# Patient Record
Sex: FEMALE | Race: WHITE | NOT HISPANIC OR LATINO | Employment: FULL TIME | URBAN - METROPOLITAN AREA
[De-identification: names, ages, dates, MRNs, and addresses within clinical notes are randomized per-mention and may not be internally consistent; named-entity substitution may affect disease eponyms.]

---

## 2023-01-25 ENCOUNTER — TELEPHONE (OUTPATIENT)
Dept: OBSTETRICS AND GYNECOLOGY | Facility: CLINIC | Age: 48
End: 2023-01-25

## 2023-01-25 NOTE — TELEPHONE ENCOUNTER
Caller: Lexus Tejada    Relationship to patient: Self    Best call back number: [phone]      Type of visit: NEW GYN       If rescheduling, when is the original appointment: 1/25/23    Additional notes:PT CANCELLED TODAYS APPT DUE TO BEING ON HER CYCLE SHE WILL CALL BACK TO R/S.

## 2024-06-03 ENCOUNTER — OFFICE VISIT (OUTPATIENT)
Dept: ORTHOPEDIC SURGERY | Facility: CLINIC | Age: 49
End: 2024-06-03
Payer: COMMERCIAL

## 2024-06-03 VITALS
HEART RATE: 72 BPM | WEIGHT: 196 LBS | HEIGHT: 61 IN | DIASTOLIC BLOOD PRESSURE: 93 MMHG | BODY MASS INDEX: 37 KG/M2 | SYSTOLIC BLOOD PRESSURE: 180 MMHG | OXYGEN SATURATION: 97 %

## 2024-06-03 DIAGNOSIS — M17.0 OSTEOARTHRITIS OF BOTH KNEES, UNSPECIFIED OSTEOARTHRITIS TYPE: ICD-10-CM

## 2024-06-03 DIAGNOSIS — M25.561 PAIN IN BOTH KNEES, UNSPECIFIED CHRONICITY: Primary | ICD-10-CM

## 2024-06-03 DIAGNOSIS — M25.562 PAIN IN BOTH KNEES, UNSPECIFIED CHRONICITY: Primary | ICD-10-CM

## 2024-06-03 PROCEDURE — 20610 DRAIN/INJ JOINT/BURSA W/O US: CPT | Performed by: ORTHOPAEDIC SURGERY

## 2024-06-03 PROCEDURE — 99203 OFFICE O/P NEW LOW 30 MIN: CPT | Performed by: ORTHOPAEDIC SURGERY

## 2024-06-03 RX ORDER — GABAPENTIN 300 MG/1
CAPSULE ORAL
COMMUNITY
Start: 2024-05-06

## 2024-06-03 RX ORDER — CHOLECALCIFEROL (VITAMIN D3) 1250 MCG
CAPSULE ORAL
COMMUNITY
Start: 2024-05-07

## 2024-06-03 RX ORDER — TRIAMCINOLONE ACETONIDE 40 MG/ML
40 INJECTION, SUSPENSION INTRA-ARTICULAR; INTRAMUSCULAR
Status: COMPLETED | OUTPATIENT
Start: 2024-06-03 | End: 2024-06-03

## 2024-06-03 RX ORDER — FERROUS SULFATE 324(65)MG
1 TABLET, DELAYED RELEASE (ENTERIC COATED) ORAL
COMMUNITY
Start: 2024-02-07

## 2024-06-03 RX ORDER — LEVOTHYROXINE SODIUM 112 UG/1
TABLET ORAL
COMMUNITY
Start: 2024-05-07

## 2024-06-03 RX ORDER — LIDOCAINE HYDROCHLORIDE 10 MG/ML
5 INJECTION, SOLUTION INFILTRATION; PERINEURAL
Status: COMPLETED | OUTPATIENT
Start: 2024-06-03 | End: 2024-06-03

## 2024-06-03 RX ORDER — BISOPROLOL FUMARATE AND HYDROCHLOROTHIAZIDE 10; 6.25 MG/1; MG/1
1 TABLET ORAL DAILY
COMMUNITY
Start: 2024-05-20

## 2024-06-03 RX ORDER — TIZANIDINE 4 MG/1
1 TABLET ORAL 3 TIMES DAILY
COMMUNITY
Start: 2024-05-29

## 2024-06-03 RX ORDER — LOSARTAN POTASSIUM 25 MG/1
TABLET ORAL
COMMUNITY
Start: 2024-05-06

## 2024-06-03 RX ADMIN — TRIAMCINOLONE ACETONIDE 40 MG: 40 INJECTION, SUSPENSION INTRA-ARTICULAR; INTRAMUSCULAR at 15:09

## 2024-06-03 RX ADMIN — LIDOCAINE HYDROCHLORIDE 5 ML: 10 INJECTION, SOLUTION INFILTRATION; PERINEURAL at 15:09

## 2024-06-03 NOTE — PROGRESS NOTES
"Chief Complaint  Initial Evaluation of the Right Knee and Initial Evaluation of the Left Knee     Subjective      Lexus Tejada presents to [facility] for initial evaluation of bilateral knees. She works on concrete floor and has pain all the time.  The pain is affecting her daily tasks and ADL's.  She has had injections in the past.  She notes the knees are getting worse and has tried exercises and anti inflammatories.  She has had no recent injury or fall.     Allergies   Allergen Reactions    Lisinopril Anaphylaxis    Penicillin G Other (See Comments)     Patient states when she was a kid        Social History     Socioeconomic History    Marital status:    Tobacco Use    Smoking status: Never    Smokeless tobacco: Never   Vaping Use    Vaping status: Never Used        I reviewed the patient's chief complaint, history of present illness, review of systems, past medical history, surgical history, family history, social history, medications, and allergy list.     Review of Systems     Constitutional: Denies fevers, chills, weight loss  Cardiovascular: Denies chest pain, shortness of breath  Skin: Denies rashes, acute skin changes  Neurologic: Denies headache, loss of consciousness        Vital Signs:   /93   Pulse 72   Ht 154.9 cm (61\")   Wt 88.9 kg (196 lb)   SpO2 97%   BMI 37.03 kg/m²          Physical Exam  General: Alert. No acute distress    Ortho Exam        BILATERAL KNEES Flexion 120. Extension 0. Stable to varus/valgus stress. Stable to anterior/posterior drawer. Neurovascularly intact. Negative Heladio. Negative Lachman. Positive EHL, FHL, HS and TA. Sensation intact to light touch all 5 nerves of the foot. Ambulates with Antalgic gait. Patella is well tracking. Calf supple, non-tender. Positive tenderness to the medial joint line. Positive tenderness to the lateral joint line. Positive Crepitus. Good strength to hamstrings, quadriceps, dorsiflexors, " and plantar flexors.  Knee Extensor Mechanism intact Kellgren-Martin rating scale a 4.         Large Joint Arthrocentesis: R knee  Date/Time: 6/3/2024 3:09 PM  Consent given by: patient  Site marked: site marked  Timeout: Immediately prior to procedure a time out was called to verify the correct patient, procedure, equipment, support staff and site/side marked as required   Supporting Documentation  Indications: pain   Procedure Details  Location: knee - R knee  Needle gauge: 21 G.  Medications administered: 5 mL lidocaine 1 %; 40 mg triamcinolone acetonide 40 MG/ML  Patient tolerance: patient tolerated the procedure well with no immediate complications      Large Joint Arthrocentesis: L knee  Date/Time: 6/3/2024 3:09 PM  Consent given by: patient  Site marked: site marked  Timeout: Immediately prior to procedure a time out was called to verify the correct patient, procedure, equipment, support staff and site/side marked as required   Supporting Documentation  Indications: pain   Procedure Details  Location: knee - L knee  Needle gauge: 21 G.  Medications administered: 5 mL lidocaine 1 %; 40 mg triamcinolone acetonide 40 MG/ML  Patient tolerance: patient tolerated the procedure well with no immediate complications      This injection documentation was Scribed for Juancho Fontaine MD by Radha Dodge.  06/03/24   15:10 EDT      Imaging Results (Most Recent)       Procedure Component Value Units Date/Time    XR Knee 3 View Right [[id number]] Resulted: 06/03/24 1437     Updated: 06/03/24 1444    XR Knee 3 View Left [[id number]] Resulted: 06/03/24 1437     Updated: 06/03/24 1444             Result Review :     X-Ray Report:  Right knee X-Ray  Indication: Evaluation of the right knee  AP/Lateral and [place] view(s)  Findings: Advanced degenerative bone on bone in the medial compartment and the patella femoral line.   Prior studies available for comparison: no     X-Ray Report:  Left knee X-Ray  Indication: Evaluation of the  left knee  AP/Lateral and [place] view(s)  Findings: Advanced degenerative bone on bone in the medial compartment and the patella femoral line.   Prior studies available for comparison: no             Assessment and Plan     Diagnoses and all orders for this visit:    1. Pain in both knees, unspecified chronicity (Primary)  -     XR Knee 3 View Right  -     XR Knee 3 View Left    2. Osteoarthritis of both knees, unspecified osteoarthritis type  -     Large Joint Arthrocentesis: R knee  -     Large Joint Arthrocentesis: L knee        Discussed the treatment plan with the patient. I reviewed the X-rays that were obtained today with the patient.     Discussed the treatment options with the patient, operative vs non-operative. The patient is a candidate for a total knee arthroplasty whenever she is ready.      Discussed the risks and benefits of conservative measures. The patient expressed understanding and wished to proceed with bilateral knee steroid injections.  She tolerated the injections well.       Call or return if worsening symptoms.    Follow Up     4-6 weeks Discuss if injection was helpful vs surgical intervention.       Patient was given instructions and counseling regarding her condition or for health maintenance advice. Please see specific information pulled into the AVS if appropriate.     Scribed for Juancho Fontaine MD by Sydnee Bonilla MA.  06/03/24   14:36 EDT      I have personally performed the services described in this document as scribed by the above individual and it is both accurate and complete. Juancho Fontaine MD 06/03/24

## 2024-07-03 ENCOUNTER — OFFICE VISIT (OUTPATIENT)
Dept: ORTHOPEDIC SURGERY | Facility: CLINIC | Age: 49
End: 2024-07-03
Payer: COMMERCIAL

## 2024-07-03 ENCOUNTER — PREP FOR SURGERY (OUTPATIENT)
Dept: OTHER | Facility: HOSPITAL | Age: 49
End: 2024-07-03
Payer: COMMERCIAL

## 2024-07-03 VITALS
BODY MASS INDEX: 37 KG/M2 | HEART RATE: 73 BPM | SYSTOLIC BLOOD PRESSURE: 165 MMHG | HEIGHT: 61 IN | OXYGEN SATURATION: 97 % | WEIGHT: 195.99 LBS | DIASTOLIC BLOOD PRESSURE: 93 MMHG

## 2024-07-03 DIAGNOSIS — M25.561 PAIN IN BOTH KNEES, UNSPECIFIED CHRONICITY: Primary | ICD-10-CM

## 2024-07-03 DIAGNOSIS — M17.0 OSTEOARTHRITIS OF BOTH KNEES, UNSPECIFIED OSTEOARTHRITIS TYPE: ICD-10-CM

## 2024-07-03 DIAGNOSIS — Z96.652 AFTERCARE FOLLOWING LEFT KNEE JOINT REPLACEMENT SURGERY: Primary | ICD-10-CM

## 2024-07-03 DIAGNOSIS — Z47.1 AFTERCARE FOLLOWING LEFT KNEE JOINT REPLACEMENT SURGERY: Primary | ICD-10-CM

## 2024-07-03 DIAGNOSIS — M25.562 PAIN IN BOTH KNEES, UNSPECIFIED CHRONICITY: Primary | ICD-10-CM

## 2024-07-03 PROCEDURE — 99214 OFFICE O/P EST MOD 30 MIN: CPT

## 2024-07-03 RX ORDER — TRANEXAMIC ACID 10 MG/ML
1000 INJECTION, SOLUTION INTRAVENOUS ONCE
OUTPATIENT
Start: 2024-07-03 | End: 2024-07-03

## 2024-07-05 NOTE — PROGRESS NOTES
"Chief Complaint  Pain and Follow-up of the Left Knee and Pain and Follow-up of the Right Knee    Subjective      Lexus Tejada presents to [facility] for follow up of her left knee. Patient has bilateral knee pain and severe osteoarthritis that she has tried managing for years. She was last seen in office on 6/3/24 and received bilateral knee steroid injections and states she didn't get any relief. She reports the knee pain and issues she has been dealing with for over 20 years. She has tried taking anti-inflammatories, exercises, multiples rounds of PT, and now injections again. She is ready to proceed with knee replacement.      Allergies   Allergen Reactions    Lisinopril Anaphylaxis    Penicillin G Other (See Comments)     Patient states when she was a kid       Objective     Vital Signs:   Vitals:    07/03/24 1431   BP: 165/93   Pulse: 73   SpO2: 97%   Weight: 88.9 kg (195 lb 15.8 oz)   Height: 154.9 cm (61\")     Body mass index is 37.03 kg/m².    I reviewed the patient's chief complaint, history of present illness, review of systems, past medical history, surgical history, family history, social history, medications, and allergy list.     REVIEW OF SYSTEMS    Constitutional: Denies fevers, chills, weight loss  Cardiovascular: Denies chest pain, shortness of breath  Skin: Denies rashes, acute skin changes  Neurologic: Denies headache, loss of consciousness  MSK: bilareral knee pain .     Ortho Exam  Knee   General: Alert, no acute distress.   Left Knee: No pain with passive hip ROM.  Knee extensor mechanism intact. Full knee extension. Flexion to 125 degrees. Calf soft, non-tender.  Sensation and neurovascularly intact.  Demonstrates active ankle dorsiflexion and plantarflexion.  Palpable pedal pulses.               Imaging Results (Most Recent)       None                Assessment and Plan   Diagnoses and all orders for this visit:    1. Pain in both knees, unspecified " chronicity (Primary)    2. Osteoarthritis of both knees, unspecified osteoarthritis type         Lexus Tejada presents to [facility] for her bilateral knees. Patient has bilateral knee arthritis that she has tried treating conservatively for years. She reports she has tried PT, home exercises, weight loss, NSAIDs, and intermittent injections. She reports none of the conservative treatment has improved her knee pain or quality of life. She is ready to proceed with left total knee arthroplasty.     Discussed surgery with the patient. Risks/benefits discussed with patient including, but not limited to: infection, bleeding, neurovascular damage, malunion, nonunion, aesthetic deformity, need for further surgery, and death. Discussed with patient the implant type being used during surgery. Patient understands and desires to proceed. Surgery pamphlet provided to patient. Patient is to meet with our surgery scheduler at check out and proceed with scheduling of surgery.     All questions and concerns were addressed and answered. Patient left the office without any additional questions.         Tobacco Use: Low Risk  (7/3/2024)    Patient History     Smoking Tobacco Use: Never     Smokeless Tobacco Use: Never     Passive Exposure: Not on file     Patient reports that they are a nonsmoker; cessation education not applicable.       Follow Up   No follow-ups on file.  There are no Patient Instructions on file for this visit.  Patient was given instructions and counseling regarding her condition or for health maintenance advice. Please see specific information pulled into the AVS if appropriate.       Dictated Utilizing Dragon Dictation. Please note that portions of this note were completed with a voice recognition program. Part of this note may be an electronic transcription/translation of spoken language to printed text using the Dragon Dictation System.

## 2024-07-08 ENCOUNTER — TELEPHONE (OUTPATIENT)
Dept: ORTHOPEDIC SURGERY | Facility: CLINIC | Age: 49
End: 2024-07-08

## 2024-07-08 NOTE — TELEPHONE ENCOUNTER
Hub staff attempted to follow warm transfer process and was unsuccessful     Caller: Lexus Tejada    Relationship to patient: Self    Best call back number: [phone]    Patient is needing: PATIENT WANTED TO LET THE OFFICE KNOW THAT [employer]ON [employer] WILL BE CONTACTING US FOR FMLA PROCESSING

## 2024-07-16 ENCOUNTER — PRE-ADMISSION TESTING (OUTPATIENT)
Dept: PREADMISSION TESTING | Facility: HOSPITAL | Age: 49
End: 2024-07-16
Payer: COMMERCIAL

## 2024-07-16 VITALS
DIASTOLIC BLOOD PRESSURE: 90 MMHG | OXYGEN SATURATION: 99 % | TEMPERATURE: 98.3 F | HEART RATE: 62 BPM | SYSTOLIC BLOOD PRESSURE: 148 MMHG

## 2024-07-16 DIAGNOSIS — M17.0 OSTEOARTHRITIS OF BOTH KNEES, UNSPECIFIED OSTEOARTHRITIS TYPE: ICD-10-CM

## 2024-07-16 DIAGNOSIS — M25.562 PAIN IN BOTH KNEES, UNSPECIFIED CHRONICITY: ICD-10-CM

## 2024-07-16 DIAGNOSIS — M25.561 PAIN IN BOTH KNEES, UNSPECIFIED CHRONICITY: ICD-10-CM

## 2024-07-16 LAB
ABO GROUP BLD: NORMAL
ALBUMIN SERPL-MCNC: 3.7 G/DL (ref 3.5–5.2)
ALBUMIN/GLOB SERPL: 1.3 G/DL
ALP SERPL-CCNC: 74 U/L (ref 39–117)
ALT SERPL W P-5'-P-CCNC: 8 U/L (ref 1–33)
ANION GAP SERPL CALCULATED.3IONS-SCNC: 10 MMOL/L (ref 5–15)
ANISOCYTOSIS BLD QL: NORMAL
AST SERPL-CCNC: 11 U/L (ref 1–32)
BASOPHILS # BLD AUTO: 0.03 10*3/MM3 (ref 0–0.2)
BASOPHILS NFR BLD AUTO: 0.4 % (ref 0–1.5)
BILIRUB SERPL-MCNC: 0.3 MG/DL (ref 0–1.2)
BUN SERPL-MCNC: 9 MG/DL (ref 6–20)
BUN/CREAT SERPL: 11.3 (ref 7–25)
CALCIUM SPEC-SCNC: 8.8 MG/DL (ref 8.6–10.5)
CHLORIDE SERPL-SCNC: 98 MMOL/L (ref 98–107)
CO2 SERPL-SCNC: 25 MMOL/L (ref 22–29)
CREAT SERPL-MCNC: 0.8 MG/DL (ref 0.57–1)
DEPRECATED RDW RBC AUTO: 40.6 FL (ref 37–54)
EGFRCR SERPLBLD CKD-EPI 2021: 91 ML/MIN/1.73
EOSINOPHIL # BLD AUTO: 0.04 10*3/MM3 (ref 0–0.4)
EOSINOPHIL NFR BLD AUTO: 0.6 % (ref 0.3–6.2)
ERYTHROCYTE [DISTWIDTH] IN BLOOD BY AUTOMATED COUNT: 17 % (ref 12.3–15.4)
GLOBULIN UR ELPH-MCNC: 2.9 GM/DL
GLUCOSE SERPL-MCNC: 100 MG/DL (ref 65–99)
HBA1C MFR BLD: 5.8 % (ref 4.8–5.6)
HCT VFR BLD AUTO: 29.2 % (ref 34–46.6)
HGB BLD-MCNC: 8.6 G/DL (ref 12–15.9)
HYPOCHROMIA BLD QL: NORMAL
IMM GRANULOCYTES # BLD AUTO: 0.01 10*3/MM3 (ref 0–0.05)
IMM GRANULOCYTES NFR BLD AUTO: 0.1 % (ref 0–0.5)
LYMPHOCYTES # BLD AUTO: 1.83 10*3/MM3 (ref 0.7–3.1)
LYMPHOCYTES NFR BLD AUTO: 26.7 % (ref 19.6–45.3)
MCH RBC QN AUTO: 19.9 PG (ref 26.6–33)
MCHC RBC AUTO-ENTMCNC: 29.5 G/DL (ref 31.5–35.7)
MCV RBC AUTO: 67.6 FL (ref 79–97)
MICROCYTES BLD QL: NORMAL
MONOCYTES # BLD AUTO: 0.46 10*3/MM3 (ref 0.1–0.9)
MONOCYTES NFR BLD AUTO: 6.7 % (ref 5–12)
NEUTROPHILS NFR BLD AUTO: 4.49 10*3/MM3 (ref 1.7–7)
NEUTROPHILS NFR BLD AUTO: 65.5 % (ref 42.7–76)
NRBC BLD AUTO-RTO: 0 /100 WBC (ref 0–0.2)
PLATELET # BLD AUTO: 382 10*3/MM3 (ref 140–450)
PMV BLD AUTO: 9.6 FL (ref 6–12)
POLYCHROMASIA BLD QL SMEAR: NORMAL
POTASSIUM SERPL-SCNC: 4 MMOL/L (ref 3.5–5.2)
PROT SERPL-MCNC: 6.6 G/DL (ref 6–8.5)
QT INTERVAL: 428 MS
QTC INTERVAL: 417 MS
RBC # BLD AUTO: 4.32 10*6/MM3 (ref 3.77–5.28)
RH BLD: POSITIVE
SMALL PLATELETS BLD QL SMEAR: ADEQUATE
SODIUM SERPL-SCNC: 133 MMOL/L (ref 136–145)
WBC MORPH BLD: NORMAL
WBC NRBC COR # BLD AUTO: 6.86 10*3/MM3 (ref 3.4–10.8)

## 2024-07-16 PROCEDURE — 85025 COMPLETE CBC W/AUTO DIFF WBC: CPT

## 2024-07-16 PROCEDURE — 83036 HEMOGLOBIN GLYCOSYLATED A1C: CPT

## 2024-07-16 PROCEDURE — 85007 BL SMEAR W/DIFF WBC COUNT: CPT

## 2024-07-16 PROCEDURE — 86901 BLOOD TYPING SEROLOGIC RH(D): CPT

## 2024-07-16 PROCEDURE — 36415 COLL VENOUS BLD VENIPUNCTURE: CPT

## 2024-07-16 PROCEDURE — 86900 BLOOD TYPING SEROLOGIC ABO: CPT

## 2024-07-16 PROCEDURE — 80053 COMPREHEN METABOLIC PANEL: CPT

## 2024-07-16 PROCEDURE — 93005 ELECTROCARDIOGRAM TRACING: CPT

## 2024-07-16 RX ORDER — IBUPROFEN 200 MG
200 TABLET ORAL EVERY 6 HOURS PRN
COMMUNITY

## 2024-07-16 NOTE — DISCHARGE INSTRUCTIONS
IMPORTANT INSTRUCTIONS - PRE-ADMISSION TESTING  DO NOT EAT OR CHEW anything after midnight the night before your procedure.    You may have CLEAR liquids up to _3__ hours prior to ARRIVAL time.   Take the following medications the morning of your procedure with JUST A SIP OF WATER:  BISOPROLOL/HCTZ, LEVOTHRYOXINE ____________  DO NOT BRING your medications to the hospital with you, UNLESS something has changed since your PRE-Admission Testing appointment.  Hold all vitamins, supplements, and NSAIDS (Non- steroidal anti-inflammatory meds) for one week prior to surgery (you MAY take Tylenol or Acetaminophen).  If you are diabetic, check your blood sugar the morning of your procedure. If it is less than 70 or if you are feeling symptomatic, call the following number for further instructions: [phone]__.  Use your inhalers/nebulizers as usual, the morning of your procedure. BRING YOUR INHALERS with you.   Bring your CPAP or BIPAP to hospital, ONLY IF YOU WILL BE SPENDING THE NIGHT.   Make sure you have a ride home and have someone who will stay with you the day of your procedure after you go home.  If you have any questions, please call your Pre-Admission Testing Nurse, MADELINE__ at [phone]___.   Per anesthesia request, do not smoke for 24 hours before your procedure or as instructed by your surgeon.    Clear Liquid Diet        Find out when you need to start a clear liquid diet.   Think of “clear liquids” as anything you could read a newspaper through. This includes things like water, broth, sports drinks, or tea WITHOUT any kind of milk or cream.           Once you are told to start a clear liquid diet, only drink these things until 3 hours before arrival to the hospital or when the hospital says to stop. Total volume limitation: 8 oz.       Clear liquids you CAN drink:   Water   Clear broth: beef, chicken, vegetable, or bone broth with nothing in it   Gatorade   Lemonade or Giancarlo-aid   Soda   Tea, coffee (NO  cream or honey)   Jell-O (without fruit)   Popsicles (without fruit or cream)   Italian ices   Juice without pulp: apple, white, grape   You may use salt, pepper, and sugar  NO RED LIQUIDS     Do NOT drink:   Milk or cream   Soy milk, almond milk, coconut milk, or other non-dairy drinks and   creamers   Milkshakes or smoothies   Tomato juice   Orange juice   Grapefruit juice   Cream soups or any other than broth         Clear Liquid Diet:  Do NOT eat any solid food.  Do NOT eat or suck on mints or candy.  Do NOT chew gum.  Do NOT drink thick liquids like milk or juice with pulp in it.  Do NOT add milk, cream, or anything like soy milk or almond milk to coffee or tea.       PREOPERATIVE (BEFORE SURGERY)              BATHING INSTRUCTIONS  Instructions:    You will need to shower 3 separate times utilizing the soap provided; at the times indicated   below:     - 07/24/24 PM   - 07/25/24 AM   - 07/25/24 PM      Wash your hair and face with normal shampoo and soap, rinse it well before using the surgical soap.      In the shower, wet the skin completely with water from your neck to your feet. Apply the cleanser to your   body ONLY FROM THE NECK TO YOUR FEET.     Do NOT USE THE CLEANSER ON YOUR FACE, HEAD, OR GENITAL (PRIVATE) AREAS.   Keep it out of your eyes, ears, and mouth because of the risk of injury to those areas.      Scrub with a clean washcloth for each bath utilizing the soap provided from the top of your body to the   bottom starting at the neck area.      Pay close attention to your armpits, groin area, and the site of surgery.      Wash your body gently for 5 minutes. Stand outside the stream or turn off the water while scrubbing your   body. Do NOT wash with your regular soap after the surgical cleanser is used.      RINSE THE CLEANSER OFF COMPLETELY with plenty of water. Rinse the area again thoroughly.      Dry off with a clean towel. The surgical soap can cause dryness; however do NOT APPLY LOTION,    CREAM, POWDER, and/or DEODORANT AFTER SHOWERING.     Be sure to where clean clothes after showering.      Ensure CLEAN BED LINENS AFTER FIRST wash with the surgical soap.      NO PETS ALLOWED IN THE BED with you after utilizing the surgical soap.

## 2024-07-16 NOTE — PAT
NOTIFIED JAMES AT  BEEN OFFICE OF [facility] 8.6.  TYPE AND SCREEN ORDERED FOR MORNING OF SURGERY. RETYPE BEING DRAWN OFF OF CBC COLLECTED TODAY.

## 2024-07-25 ENCOUNTER — ANESTHESIA EVENT (OUTPATIENT)
Dept: PERIOP | Facility: HOSPITAL | Age: 49
End: 2024-07-25
Payer: COMMERCIAL

## 2024-07-26 ENCOUNTER — HOSPITAL ENCOUNTER (OUTPATIENT)
Facility: HOSPITAL | Age: 49
Discharge: HOME | End: 2024-07-27
Attending: ORTHOPAEDIC SURGERY | Admitting: ORTHOPAEDIC SURGERY
Payer: COMMERCIAL

## 2024-07-26 ENCOUNTER — ANESTHESIA (OUTPATIENT)
Dept: PERIOP | Facility: HOSPITAL | Age: 49
End: 2024-07-26
Payer: COMMERCIAL

## 2024-07-26 ENCOUNTER — APPOINTMENT (OUTPATIENT)
Dept: GENERAL RADIOLOGY | Facility: HOSPITAL | Age: 49
End: 2024-07-26
Payer: COMMERCIAL

## 2024-07-26 ENCOUNTER — ANESTHESIA EVENT CONVERTED (OUTPATIENT)
Dept: ANESTHESIOLOGY | Facility: HOSPITAL | Age: 49
End: 2024-07-26
Payer: COMMERCIAL

## 2024-07-26 DIAGNOSIS — M17.12 PRIMARY OSTEOARTHRITIS OF LEFT KNEE: Primary | ICD-10-CM

## 2024-07-26 DIAGNOSIS — Z78.9 IMPAIRED MOBILITY AND ADLS: ICD-10-CM

## 2024-07-26 DIAGNOSIS — M17.0 OSTEOARTHRITIS OF BOTH KNEES, UNSPECIFIED OSTEOARTHRITIS TYPE: ICD-10-CM

## 2024-07-26 DIAGNOSIS — Z74.09 IMPAIRED MOBILITY AND ADLS: ICD-10-CM

## 2024-07-26 DIAGNOSIS — R26.2 DIFFICULTY IN WALKING: ICD-10-CM

## 2024-07-26 DIAGNOSIS — M25.562 PAIN IN BOTH KNEES, UNSPECIFIED CHRONICITY: ICD-10-CM

## 2024-07-26 DIAGNOSIS — M17.0 PRIMARY OSTEOARTHRITIS OF BOTH KNEES: ICD-10-CM

## 2024-07-26 DIAGNOSIS — M25.561 PAIN IN BOTH KNEES, UNSPECIFIED CHRONICITY: ICD-10-CM

## 2024-07-26 LAB
ABO GROUP BLD: NORMAL
BLD GP AB SCN SERPL QL: NEGATIVE
RH BLD: POSITIVE
T&S EXPIRATION DATE: NORMAL

## 2024-07-26 PROCEDURE — 97110 THERAPEUTIC EXERCISES: CPT

## 2024-07-26 PROCEDURE — 25810000003 LACTATED RINGERS PER 1000 ML: Performed by: ANESTHESIOLOGY

## 2024-07-26 PROCEDURE — 25010000002 MORPHINE PER 10 MG: Performed by: ORTHOPAEDIC SURGERY

## 2024-07-26 PROCEDURE — 25010000002 HYDROMORPHONE 1 MG/ML SOLUTION

## 2024-07-26 PROCEDURE — S0260 H&P FOR SURGERY: HCPCS | Performed by: ORTHOPAEDIC SURGERY

## 2024-07-26 PROCEDURE — 94799 UNLISTED PULMONARY SVC/PX: CPT

## 2024-07-26 PROCEDURE — 27447 TOTAL KNEE ARTHROPLASTY: CPT | Performed by: ORTHOPAEDIC SURGERY

## 2024-07-26 PROCEDURE — 25010000002 HYDRALAZINE PER 20 MG

## 2024-07-26 PROCEDURE — 97116 GAIT TRAINING THERAPY: CPT

## 2024-07-26 PROCEDURE — 94761 N-INVAS EAR/PLS OXIMETRY MLT: CPT

## 2024-07-26 PROCEDURE — 25010000002 MIDAZOLAM PER 1MG: Performed by: ANESTHESIOLOGY

## 2024-07-26 PROCEDURE — 25010000002 KETOROLAC TROMETHAMINE PER 15 MG: Performed by: ORTHOPAEDIC SURGERY

## 2024-07-26 PROCEDURE — 86850 RBC ANTIBODY SCREEN: CPT | Performed by: ANESTHESIOLOGY

## 2024-07-26 PROCEDURE — 86900 BLOOD TYPING SEROLOGIC ABO: CPT | Performed by: ANESTHESIOLOGY

## 2024-07-26 PROCEDURE — 25010000002 EPINEPHRINE 1 MG/ML SOLUTION: Performed by: ORTHOPAEDIC SURGERY

## 2024-07-26 PROCEDURE — 99204 OFFICE O/P NEW MOD 45 MIN: CPT | Performed by: PHYSICIAN ASSISTANT

## 2024-07-26 PROCEDURE — 25010000002 FENTANYL CITRATE (PF) 50 MCG/ML SOLUTION

## 2024-07-26 PROCEDURE — 25010000002 PROPOFOL 10 MG/ML EMULSION

## 2024-07-26 PROCEDURE — 25010000002 ROPIVACAINE PER 1 MG: Performed by: ORTHOPAEDIC SURGERY

## 2024-07-26 PROCEDURE — 25010000002 VANCOMYCIN 5 G RECONSTITUTED SOLUTION

## 2024-07-26 PROCEDURE — 25010000002 SUGAMMADEX 200 MG/2ML SOLUTION

## 2024-07-26 PROCEDURE — 73560 X-RAY EXAM OF KNEE 1 OR 2: CPT

## 2024-07-26 PROCEDURE — C1713 ANCHOR/SCREW BN/BN,TIS/BN: HCPCS | Performed by: ORTHOPAEDIC SURGERY

## 2024-07-26 PROCEDURE — 25810000003 LACTATED RINGERS PER 1000 ML: Performed by: ORTHOPAEDIC SURGERY

## 2024-07-26 PROCEDURE — 25010000002 CEFAZOLIN PER 500 MG: Performed by: ORTHOPAEDIC SURGERY

## 2024-07-26 PROCEDURE — 25810000003 SODIUM CHLORIDE 0.9 % SOLUTION

## 2024-07-26 PROCEDURE — 25010000002 DEXAMETHASONE PER 1 MG

## 2024-07-26 PROCEDURE — 25010000002 ONDANSETRON PER 1 MG

## 2024-07-26 PROCEDURE — 97161 PT EVAL LOW COMPLEX 20 MIN: CPT

## 2024-07-26 PROCEDURE — 20985 CPTR-ASST DIR MS PX: CPT | Performed by: ORTHOPAEDIC SURGERY

## 2024-07-26 PROCEDURE — 25010000002 ROPIVACAINE PER 1 MG: Performed by: ANESTHESIOLOGY

## 2024-07-26 PROCEDURE — C1776 JOINT DEVICE (IMPLANTABLE): HCPCS | Performed by: ORTHOPAEDIC SURGERY

## 2024-07-26 PROCEDURE — 86901 BLOOD TYPING SEROLOGIC RH(D): CPT | Performed by: ANESTHESIOLOGY

## 2024-07-26 DEVICE — COMP FEM/KN PERSONA CR CMT NRW SZ7 LT: Type: IMPLANTABLE DEVICE | Site: KNEE | Status: FUNCTIONAL

## 2024-07-26 DEVICE — ART/SRF KN PERSONA/VE PS CD/CR 6TO7 10MM LT: Type: IMPLANTABLE DEVICE | Site: KNEE | Status: FUNCTIONAL

## 2024-07-26 DEVICE — CMT BONE PALACOS R HI/VISC 1X40: Type: IMPLANTABLE DEVICE | Site: KNEE | Status: FUNCTIONAL

## 2024-07-26 DEVICE — CAP TOTL KN CMT PRIMARY W/ROSA: Type: IMPLANTABLE DEVICE | Site: KNEE | Status: FUNCTIONAL

## 2024-07-26 DEVICE — PAT KN PERSONA ALLPOLY CMT 7.5X26MM: Type: IMPLANTABLE DEVICE | Site: KNEE | Status: FUNCTIONAL

## 2024-07-26 DEVICE — STEM TIB/KN PERSONA CMT 5D SZC LT: Type: IMPLANTABLE DEVICE | Site: KNEE | Status: FUNCTIONAL

## 2024-07-26 RX ORDER — TRANEXAMIC ACID 10 MG/ML
1000 INJECTION, SOLUTION INTRAVENOUS ONCE
Status: COMPLETED | OUTPATIENT
Start: 2024-07-26 | End: 2024-07-26

## 2024-07-26 RX ORDER — ROPIVACAINE HYDROCHLORIDE 5 MG/ML
INJECTION, SOLUTION EPIDURAL; INFILTRATION; PERINEURAL
Status: COMPLETED | OUTPATIENT
Start: 2024-07-26 | End: 2024-07-26

## 2024-07-26 RX ORDER — GABAPENTIN 300 MG/1
300 CAPSULE ORAL NIGHTLY
Status: DISCONTINUED | OUTPATIENT
Start: 2024-07-26 | End: 2024-07-27 | Stop reason: HOSPADM

## 2024-07-26 RX ORDER — ONDANSETRON 2 MG/ML
4 INJECTION INTRAMUSCULAR; INTRAVENOUS ONCE AS NEEDED
Status: DISCONTINUED | OUTPATIENT
Start: 2024-07-26 | End: 2024-07-26 | Stop reason: HOSPADM

## 2024-07-26 RX ORDER — LEVOTHYROXINE SODIUM 112 UG/1
112 TABLET ORAL
Status: DISCONTINUED | OUTPATIENT
Start: 2024-07-27 | End: 2024-07-27 | Stop reason: HOSPADM

## 2024-07-26 RX ORDER — NALOXONE HCL 0.4 MG/ML
0.4 VIAL (ML) INJECTION
Status: DISCONTINUED | OUTPATIENT
Start: 2024-07-26 | End: 2024-07-27 | Stop reason: HOSPADM

## 2024-07-26 RX ORDER — AMOXICILLIN 250 MG
2 CAPSULE ORAL 2 TIMES DAILY PRN
Status: DISCONTINUED | OUTPATIENT
Start: 2024-07-26 | End: 2024-07-27 | Stop reason: HOSPADM

## 2024-07-26 RX ORDER — SODIUM CHLORIDE, SODIUM LACTATE, POTASSIUM CHLORIDE, CALCIUM CHLORIDE 600; 310; 30; 20 MG/100ML; MG/100ML; MG/100ML; MG/100ML
9 INJECTION, SOLUTION INTRAVENOUS CONTINUOUS PRN
Status: DISCONTINUED | OUTPATIENT
Start: 2024-07-26 | End: 2024-07-26 | Stop reason: HOSPADM

## 2024-07-26 RX ORDER — BISACODYL 10 MG
10 SUPPOSITORY, RECTAL RECTAL DAILY PRN
Status: DISCONTINUED | OUTPATIENT
Start: 2024-07-26 | End: 2024-07-27 | Stop reason: HOSPADM

## 2024-07-26 RX ORDER — ACETAMINOPHEN 500 MG
1000 TABLET ORAL ONCE
Status: COMPLETED | OUTPATIENT
Start: 2024-07-26 | End: 2024-07-26

## 2024-07-26 RX ORDER — ACETAMINOPHEN 500 MG
1000 TABLET ORAL EVERY 6 HOURS
Status: DISCONTINUED | OUTPATIENT
Start: 2024-07-26 | End: 2024-07-27 | Stop reason: HOSPADM

## 2024-07-26 RX ORDER — HYDROCODONE BITARTRATE AND ACETAMINOPHEN 7.5; 325 MG/1; MG/1
1 TABLET ORAL EVERY 4 HOURS PRN
Status: DISCONTINUED | OUTPATIENT
Start: 2024-07-26 | End: 2024-07-27 | Stop reason: HOSPADM

## 2024-07-26 RX ORDER — ONDANSETRON 2 MG/ML
INJECTION INTRAMUSCULAR; INTRAVENOUS AS NEEDED
Status: DISCONTINUED | OUTPATIENT
Start: 2024-07-26 | End: 2024-07-26 | Stop reason: SURG

## 2024-07-26 RX ORDER — FENTANYL CITRATE 50 UG/ML
INJECTION, SOLUTION INTRAMUSCULAR; INTRAVENOUS AS NEEDED
Status: DISCONTINUED | OUTPATIENT
Start: 2024-07-26 | End: 2024-07-26 | Stop reason: SURG

## 2024-07-26 RX ORDER — LIDOCAINE HYDROCHLORIDE 20 MG/ML
INJECTION, SOLUTION EPIDURAL; INFILTRATION; INTRACAUDAL; PERINEURAL AS NEEDED
Status: DISCONTINUED | OUTPATIENT
Start: 2024-07-26 | End: 2024-07-26 | Stop reason: SURG

## 2024-07-26 RX ORDER — CELECOXIB 100 MG/1
200 CAPSULE ORAL ONCE
Status: COMPLETED | OUTPATIENT
Start: 2024-07-26 | End: 2024-07-26

## 2024-07-26 RX ORDER — ROCURONIUM BROMIDE 10 MG/ML
INJECTION, SOLUTION INTRAVENOUS AS NEEDED
Status: DISCONTINUED | OUTPATIENT
Start: 2024-07-26 | End: 2024-07-26 | Stop reason: SURG

## 2024-07-26 RX ORDER — MIDAZOLAM HYDROCHLORIDE 2 MG/2ML
2 INJECTION, SOLUTION INTRAMUSCULAR; INTRAVENOUS ONCE
Status: COMPLETED | OUTPATIENT
Start: 2024-07-26 | End: 2024-07-26

## 2024-07-26 RX ORDER — MAGNESIUM HYDROXIDE 1200 MG/15ML
LIQUID ORAL AS NEEDED
Status: DISCONTINUED | OUTPATIENT
Start: 2024-07-26 | End: 2024-07-26 | Stop reason: HOSPADM

## 2024-07-26 RX ORDER — HYDRALAZINE HYDROCHLORIDE 20 MG/ML
INJECTION INTRAMUSCULAR; INTRAVENOUS AS NEEDED
Status: DISCONTINUED | OUTPATIENT
Start: 2024-07-26 | End: 2024-07-26 | Stop reason: SURG

## 2024-07-26 RX ORDER — DEXAMETHASONE SODIUM PHOSPHATE 4 MG/ML
INJECTION, SOLUTION INTRA-ARTICULAR; INTRALESIONAL; INTRAMUSCULAR; INTRAVENOUS; SOFT TISSUE AS NEEDED
Status: DISCONTINUED | OUTPATIENT
Start: 2024-07-26 | End: 2024-07-26 | Stop reason: SURG

## 2024-07-26 RX ORDER — OXYCODONE HYDROCHLORIDE 5 MG/1
5 TABLET ORAL
Status: DISCONTINUED | OUTPATIENT
Start: 2024-07-26 | End: 2024-07-26 | Stop reason: HOSPADM

## 2024-07-26 RX ORDER — ONDANSETRON 2 MG/ML
4 INJECTION INTRAMUSCULAR; INTRAVENOUS EVERY 6 HOURS PRN
Status: DISCONTINUED | OUTPATIENT
Start: 2024-07-26 | End: 2024-07-27 | Stop reason: HOSPADM

## 2024-07-26 RX ORDER — SUMATRIPTAN 100 MG/1
100 TABLET, FILM COATED ORAL
COMMUNITY
Start: 2024-07-25

## 2024-07-26 RX ORDER — DEXMEDETOMIDINE HYDROCHLORIDE 100 UG/ML
INJECTION, SOLUTION INTRAVENOUS AS NEEDED
Status: DISCONTINUED | OUTPATIENT
Start: 2024-07-26 | End: 2024-07-26 | Stop reason: SURG

## 2024-07-26 RX ORDER — LORATADINE 10 MG/1
10 TABLET ORAL DAILY
COMMUNITY
Start: 2024-07-25

## 2024-07-26 RX ORDER — SODIUM CHLORIDE, SODIUM LACTATE, POTASSIUM CHLORIDE, CALCIUM CHLORIDE 600; 310; 30; 20 MG/100ML; MG/100ML; MG/100ML; MG/100ML
80 INJECTION, SOLUTION INTRAVENOUS CONTINUOUS
Status: DISCONTINUED | OUTPATIENT
Start: 2024-07-26 | End: 2024-07-27 | Stop reason: HOSPADM

## 2024-07-26 RX ORDER — SODIUM CHLORIDE 0.9 % (FLUSH) 0.9 %
10 SYRINGE (ML) INJECTION EVERY 12 HOURS SCHEDULED
Status: DISCONTINUED | OUTPATIENT
Start: 2024-07-26 | End: 2024-07-27 | Stop reason: HOSPADM

## 2024-07-26 RX ORDER — PROPOFOL 10 MG/ML
VIAL (ML) INTRAVENOUS AS NEEDED
Status: DISCONTINUED | OUTPATIENT
Start: 2024-07-26 | End: 2024-07-26 | Stop reason: SURG

## 2024-07-26 RX ORDER — SODIUM CHLORIDE 9 MG/ML
40 INJECTION, SOLUTION INTRAVENOUS AS NEEDED
Status: DISCONTINUED | OUTPATIENT
Start: 2024-07-26 | End: 2024-07-27 | Stop reason: HOSPADM

## 2024-07-26 RX ORDER — SODIUM CHLORIDE 9 MG/ML
40 INJECTION, SOLUTION INTRAVENOUS AS NEEDED
Status: DISCONTINUED | OUTPATIENT
Start: 2024-07-26 | End: 2024-07-26 | Stop reason: HOSPADM

## 2024-07-26 RX ORDER — BISACODYL 5 MG/1
10 TABLET, DELAYED RELEASE ORAL DAILY PRN
Status: DISCONTINUED | OUTPATIENT
Start: 2024-07-26 | End: 2024-07-27 | Stop reason: HOSPADM

## 2024-07-26 RX ORDER — SODIUM CHLORIDE 0.9 % (FLUSH) 0.9 %
10 SYRINGE (ML) INJECTION AS NEEDED
Status: DISCONTINUED | OUTPATIENT
Start: 2024-07-26 | End: 2024-07-26 | Stop reason: HOSPADM

## 2024-07-26 RX ORDER — MIDAZOLAM HYDROCHLORIDE 2 MG/2ML
INJECTION, SOLUTION INTRAMUSCULAR; INTRAVENOUS AS NEEDED
Status: DISCONTINUED | OUTPATIENT
Start: 2024-07-26 | End: 2024-07-26 | Stop reason: SURG

## 2024-07-26 RX ORDER — SODIUM CHLORIDE 0.9 % (FLUSH) 0.9 %
10 SYRINGE (ML) INJECTION AS NEEDED
Status: DISCONTINUED | OUTPATIENT
Start: 2024-07-26 | End: 2024-07-27 | Stop reason: HOSPADM

## 2024-07-26 RX ORDER — ONDANSETRON 4 MG/1
4 TABLET, ORALLY DISINTEGRATING ORAL EVERY 6 HOURS PRN
Status: DISCONTINUED | OUTPATIENT
Start: 2024-07-26 | End: 2024-07-27 | Stop reason: HOSPADM

## 2024-07-26 RX ORDER — KETOROLAC TROMETHAMINE 15 MG/ML
15 INJECTION, SOLUTION INTRAMUSCULAR; INTRAVENOUS EVERY 6 HOURS
Status: COMPLETED | OUTPATIENT
Start: 2024-07-26 | End: 2024-07-27

## 2024-07-26 RX ORDER — HYDROCODONE BITARTRATE AND ACETAMINOPHEN 7.5; 325 MG/1; MG/1
2 TABLET ORAL EVERY 4 HOURS PRN
Status: DISCONTINUED | OUTPATIENT
Start: 2024-07-26 | End: 2024-07-27 | Stop reason: HOSPADM

## 2024-07-26 RX ADMIN — MIDAZOLAM HYDROCHLORIDE 2 MG: 1 INJECTION, SOLUTION INTRAMUSCULAR; INTRAVENOUS at 07:13

## 2024-07-26 RX ADMIN — GABAPENTIN 300 MG: 300 CAPSULE ORAL at 21:03

## 2024-07-26 RX ADMIN — HYDRALAZINE HYDROCHLORIDE 5 MG: 20 INJECTION INTRAMUSCULAR; INTRAVENOUS at 08:56

## 2024-07-26 RX ADMIN — KETOROLAC TROMETHAMINE 15 MG: 15 INJECTION, SOLUTION INTRAMUSCULAR; INTRAVENOUS at 19:52

## 2024-07-26 RX ADMIN — HYDROCODONE BITARTRATE AND ACETAMINOPHEN 1 TABLET: 7.5; 325 TABLET ORAL at 19:03

## 2024-07-26 RX ADMIN — DEXMEDETOMIDINE HYDROCHLORIDE 30 MCG: 100 INJECTION, SOLUTION, CONCENTRATE INTRAVENOUS at 07:54

## 2024-07-26 RX ADMIN — TRANEXAMIC ACID 1000 MG: 10 INJECTION, SOLUTION INTRAVENOUS at 07:14

## 2024-07-26 RX ADMIN — ROCURONIUM BROMIDE 50 MG: 10 INJECTION, SOLUTION INTRAVENOUS at 08:04

## 2024-07-26 RX ADMIN — SODIUM CHLORIDE 2 G: 9 INJECTION, SOLUTION INTRAVENOUS at 19:53

## 2024-07-26 RX ADMIN — CELECOXIB 200 MG: 100 CAPSULE ORAL at 07:13

## 2024-07-26 RX ADMIN — PROPOFOL 150 MG: 10 INJECTION, EMULSION INTRAVENOUS at 08:04

## 2024-07-26 RX ADMIN — ROCURONIUM BROMIDE 20 MG: 10 INJECTION, SOLUTION INTRAVENOUS at 08:42

## 2024-07-26 RX ADMIN — SODIUM CHLORIDE, POTASSIUM CHLORIDE, SODIUM LACTATE AND CALCIUM CHLORIDE 80 ML/HR: 600; 310; 30; 20 INJECTION, SOLUTION INTRAVENOUS at 13:34

## 2024-07-26 RX ADMIN — FENTANYL CITRATE 50 MCG: 50 INJECTION, SOLUTION INTRAMUSCULAR; INTRAVENOUS at 08:03

## 2024-07-26 RX ADMIN — Medication 5 MG: at 21:03

## 2024-07-26 RX ADMIN — SODIUM CHLORIDE, POTASSIUM CHLORIDE, SODIUM LACTATE AND CALCIUM CHLORIDE: 600; 310; 30; 20 INJECTION, SOLUTION INTRAVENOUS at 09:32

## 2024-07-26 RX ADMIN — HYDROMORPHONE HYDROCHLORIDE 0.5 MG: 1 INJECTION, SOLUTION INTRAMUSCULAR; INTRAVENOUS; SUBCUTANEOUS at 09:54

## 2024-07-26 RX ADMIN — VANCOMYCIN HYDROCHLORIDE 1250 MG: 5 INJECTION, POWDER, LYOPHILIZED, FOR SOLUTION INTRAVENOUS at 07:12

## 2024-07-26 RX ADMIN — FENTANYL CITRATE 50 MCG: 50 INJECTION, SOLUTION INTRAMUSCULAR; INTRAVENOUS at 08:39

## 2024-07-26 RX ADMIN — LIDOCAINE HYDROCHLORIDE 100 MG: 20 INJECTION, SOLUTION INTRAVENOUS at 08:04

## 2024-07-26 RX ADMIN — Medication 10 ML: at 13:39

## 2024-07-26 RX ADMIN — HYDROMORPHONE HYDROCHLORIDE 0.5 MG: 1 INJECTION, SOLUTION INTRAMUSCULAR; INTRAVENOUS; SUBCUTANEOUS at 10:02

## 2024-07-26 RX ADMIN — KETOROLAC TROMETHAMINE 15 MG: 15 INJECTION, SOLUTION INTRAMUSCULAR; INTRAVENOUS at 13:39

## 2024-07-26 RX ADMIN — SODIUM CHLORIDE, POTASSIUM CHLORIDE, SODIUM LACTATE AND CALCIUM CHLORIDE 9 ML/HR: 600; 310; 30; 20 INJECTION, SOLUTION INTRAVENOUS at 07:11

## 2024-07-26 RX ADMIN — OXYCODONE HYDROCHLORIDE 5 MG: 5 TABLET ORAL at 09:55

## 2024-07-26 RX ADMIN — SUGAMMADEX 200 MG: 100 INJECTION, SOLUTION INTRAVENOUS at 09:36

## 2024-07-26 RX ADMIN — Medication 10 ML: at 21:03

## 2024-07-26 RX ADMIN — ACETAMINOPHEN 1000 MG: 500 TABLET ORAL at 07:13

## 2024-07-26 RX ADMIN — HYDROCODONE BITARTRATE AND ACETAMINOPHEN 2 TABLET: 7.5; 325 TABLET ORAL at 13:35

## 2024-07-26 RX ADMIN — TRANEXAMIC ACID 1000 MG: 10 INJECTION, SOLUTION INTRAVENOUS at 09:26

## 2024-07-26 RX ADMIN — ONDANSETRON HYDROCHLORIDE 4 MG: 2 SOLUTION INTRAMUSCULAR; INTRAVENOUS at 09:26

## 2024-07-26 RX ADMIN — ROPIVACAINE HYDROCHLORIDE 30 ML: 5 INJECTION, SOLUTION EPIDURAL; INFILTRATION; PERINEURAL at 07:49

## 2024-07-26 RX ADMIN — VANCOMYCIN HYDROCHLORIDE 1250 MG: 5 INJECTION, POWDER, LYOPHILIZED, FOR SOLUTION INTRAVENOUS at 07:59

## 2024-07-26 RX ADMIN — SODIUM CHLORIDE 2 G: 9 INJECTION, SOLUTION INTRAVENOUS at 13:34

## 2024-07-26 RX ADMIN — DEXAMETHASONE SODIUM PHOSPHATE 8 MG: 4 INJECTION, SOLUTION INTRAMUSCULAR; INTRAVENOUS at 08:09

## 2024-07-26 RX ADMIN — HYDROMORPHONE HYDROCHLORIDE 0.5 MG: 1 INJECTION, SOLUTION INTRAMUSCULAR; INTRAVENOUS; SUBCUTANEOUS at 09:24

## 2024-07-26 RX ADMIN — MIDAZOLAM HYDROCHLORIDE 2 MG: 1 INJECTION, SOLUTION INTRAMUSCULAR; INTRAVENOUS at 07:54

## 2024-07-26 NOTE — THERAPY TREATMENT NOTE
Acute Care - Physical Therapy Treatment Note   [place]     Patient Name: Lexus Tejada  : 1975  MRN: [id number]  Today's Date: 2024      Visit Dx:     ICD-10-CM ICD-9-CM   1. Primary osteoarthritis of left knee  M17.12 715.16   2. Pain in both knees, unspecified chronicity  M25.561 719.46    M25.562    3. Osteoarthritis of both knees, unspecified osteoarthritis type  M17.0 715.96   4. Difficulty in walking  R26.2 719.7   5. Primary osteoarthritis of both knees  M17.0 715.16     Patient Active Problem List   Diagnosis    Pain in both knees    Osteoarthritis of both knees    Primary osteoarthritis of left knee     Past Medical History:   Diagnosis Date    Arthritis     Back pain     Disease of thyroid gland     Hypertension     Iron deficiency anemia     Migraines      Past Surgical History:   Procedure Laterality Date    CHOLECYSTECTOMY      LAPAROSCOPIC TUBAL LIGATION       PT Assessment (Last 12 Hours)       PT Evaluation and Treatment       Row Name 24 1433 24 1000       Physical Therapy Time and Intention    Subjective Information no complaints;pain (P)   -TL complains of;pain  -SERGIO    Document Type therapy note (daily note) (P)   -TL evaluation  -SERGIO    Mode of Treatment individual therapy;physical therapy (P)   -TL individual therapy;physical therapy  -SERGIO    Patient Effort good (P)   -TL good  -SERGIO    Symptoms Noted During/After Treatment none (P)   -TL none  -SERGIO      Row Name 24 1000          General Information    Patient Observations alert;cooperative;agree to therapy  -SERGIO     Prior Level of Function independent:;all household mobility;community mobility  -SERGIO     Equipment Currently Used at Home none  -SERGIO     Existing Precautions/Restrictions fall;weight bearing  -SERGIO     Barriers to Rehab none identified  -SERGIO       Row Name 24 1000          Living Environment    Current Living Arrangements home  -SERGIO     People in Home spouse  -SERGIO       Row Name 24 1000           Cognition    Orientation Status (Cognition) oriented x 3  -SERGIO       Row Name 07/26/24 1000          Range of Motion Comprehensive    General Range of Motion lower extremity range of motion deficits identified  15-50° L knee limited by pain  -SERGIO       Row Name 07/26/24 1000          Strength Comprehensive (MMT)    General Manual Muscle Testing (MMT) Assessment lower extremity strength deficits identified  LLE3+/5  -SERGIO       Row Name 07/26/24 1433 07/26/24 1000       Bed Mobility    Bed Mobility -- bed mobility (all) activities;supine-sit  -SERGIO    All Activities, [place] (Bed Mobility) -- contact guard  -SERGIO    Supine-Sit [place] (Bed Mobility) -- contact guard  -SERGIO    Comment, (Bed Mobility) Pt was seated in bed side chair upon entry into room. (P)   -TL --      Row Name 07/26/24 1433 07/26/24 1000       Transfers    Transfers sit-stand transfer;stand-sit transfer (P)   -TL bed-chair transfer;sit-stand transfer  -SERGIO      Row Name 07/26/24 1000          Bed-Chair Transfer    Bed-Chair [place] (Transfers) contact guard  -SERGIO     Assistive Device (Bed-Chair Transfers) walker, front-wheeled  -SERGIO       Row Name 07/26/24 1433 07/26/24 1000       Sit-Stand Transfer    Sit-Stand [place] (Transfers) contact guard (P)   -TL contact guard  -SERGIO    Assistive Device (Sit-Stand Transfers) walker, front-wheeled (P)   -TL walker, front-wheeled  -SERGIO      Row Name 07/26/24 1433          Stand-Sit Transfer    Stand-Sit [place] (Transfers) contact guard (P)   -TL     Assistive Device (Stand-Sit Transfers) walker, front-wheeled (P)   -TL       Row Name 07/26/24 1433 07/26/24 1000       Gait/Stairs (Locomotion)    Gait/Stairs Locomotion gait/ambulation assistive device (P)   -TL gait/ambulation assistive device  -SERGIO    [place] Level (Gait) contact guard (P)   -TL contact guard  -SERGIO    Assistive Device (Gait) walker, front-wheeled (P)   -TL walker, front-wheeled  -SERGIO    Patient was able to Ambulate yes (P)    -TL yes  -SERGIO    Distance in Feet (Gait) 150 (P)   -TL 25  -SERGIO    Pattern (Gait) step-through (P)   -TL step-to  -SERGIO      Row Name 07/26/24 1000          Safety Issues, Functional Mobility    Impairments Affecting Function (Mobility) balance;pain;range of motion (ROM);strength  -SERGIO       Row Name 07/26/24 1433 07/26/24 1000       Balance    Balance Assessment standing dynamic balance (P)   -TL standing dynamic balance  -SERGIO    Dynamic Standing Balance contact guard (P)   -TL contact guard  -SERGIO    Position/Device Used, Standing Balance walker, front-wheeled (P)   -TL walker, front-wheeled  -SERGIO      Row Name 07/26/24 1433          Motor Skills    Therapeutic Exercise other (see comments) (P)   Pt performed ankle pumps, quad sets, glute sets, heel slides, SAQ's, SLR's and LAQ's 2x10 of each.  -TL       Row Name             Wound 07/26/24 0841 Left anterior knee Incision    Wound - Properties Group Placement Date: 07/26/24  -BW Placement Time: 0841  -BW Present on Original Admission: N  -BW Side: Left  -BW Orientation: anterior  -BW Location: knee  -BW Primary Wound Type: Incision  -BW    Retired Wound - Properties Group Placement Date: 07/26/24  -BW Placement Time: 0841  -BW Present on Original Admission: N  -BW Side: Left  -BW Orientation: anterior  -BW Location: knee  -BW Primary Wound Type: Incision  -BW    Retired Wound - Properties Group Date first assessed: 07/26/24  -BW Time first assessed: 0841  -BW Present on Original Admission: N  -BW Side: Left  -BW Location: knee  -BW Primary Wound Type: Incision  -BW      Row Name             Wound 07/26/24 0845 Left proximal leg Incision    Wound - Properties Group Placement Date: 07/26/24  -BW Placement Time: 0845  -BW Present on Original Admission: N  -BW Side: Left  -BW Orientation: proximal  -BW Location: leg  -BW Primary Wound Type: Incision  -BW    Retired Wound - Properties Group Placement Date: 07/26/24  -BW Placement Time: 0845  -BW Present on Original Admission: N   -BW Side: Left  -BW Orientation: proximal  -BW Location: leg  -BW Primary Wound Type: Incision  -BW    Retired Wound - Properties Group Date first assessed: 07/26/24  -BW Time first assessed: 0845 -BW Present on Original Admission: N  -BW Side: Left  -BW Location: leg  -BW Primary Wound Type: Incision  -BW      Row Name 07/26/24 1000          Plan of Care Review    Plan of Care Reviewed With patient  -SERGIO     Outcome Evaluation Pt presents with decreased ROM, strength, transfers and ambulation.  Skilled PT services will be required to address these mobility deficits.  -SERGIO       Row Name 07/26/24 1433          Positioning and Restraints    Pre-Treatment Position sitting in chair/recliner (P)   -TL     Post Treatment Position chair (P)   -TL     In Chair reclined;sitting;call light within reach;encouraged to call for assist;exit alarm on (P)   -TL       Row Name 07/26/24 1000          Therapy Assessment/Plan (PT)    Patient/Family Therapy Goals Statement (PT) Walk without pain  -SERGIO     Rehab Potential (PT) good, to achieve stated therapy goals  -SERGIO     Criteria for Skilled Interventions Met (PT) skilled treatment is necessary  -SERGIO     Therapy Frequency (PT) 2 times/day  -SERGIO     Predicted Duration of Therapy Intervention (PT) 10 days  -SERGIO     Problem List (PT) problems related to;balance;mobility;range of motion (ROM);strength;pain  -SERGIO     Activity Limitations Related to Problem List (PT) unable to ambulate safely;unable to transfer safely  -SERGIO       Row Name 07/26/24 1000          PT Evaluation Complexity    History, PT Evaluation Complexity no personal factors and/or comorbidities  -SERGIO     Examination of Body Systems (PT Eval Complexity) total of 4 or more elements  -SERGIO     Clinical Presentation (PT Evaluation Complexity) stable  -SERGIO     Clinical Decision Making (PT Evaluation Complexity) low complexity  -SERGIO     Overall Complexity (PT Evaluation Complexity) low complexity  -SERGIO       Row Name 07/26/24 1434           Progress Summary (PT)    Daily Progress Summary (PT) Pt is progressing well with strength, ROM, and overall function.  Continue to progress as tolerated and with POC. (P)   -TL       Row Name 07/26/24 1000          Therapy Plan Review/Discharge Plan (PT)    Therapy Plan Review (PT) evaluation/treatment results reviewed;participants voiced agreement with care plan;participants included;patient  -SERGIO       Row Name 07/26/24 1000          Physical Therapy Goals    Transfer Goal Selection (PT) transfer, PT goal 1  -SERGIO     Gait Training Goal Selection (PT) gait training, PT goal 1  -SERGIO     ROM Goal Selection (PT) ROM, PT goal 1  -SERGIO     Strength Goal Selection (PT) strength, PT goal 1  -SERGIO       Row Name 07/26/24 1000          Transfer Goal 1 (PT)    Activity/Assistive Device (Transfer Goal 1, PT) transfers, all  -SERGIO     [place] Level/Cues Needed (Transfer Goal 1, PT) independent  -SERGIO     Time Frame (Transfer Goal 1, PT) long term goal (LTG);10 days  -SERGIO       Row Name 07/26/24 1000          Gait Training Goal 1 (PT)    Activity/Assistive Device (Gait Training Goal 1, PT) gait (walking locomotion);walker, rolling  -SERGIO     [place] Level (Gait Training Goal 1, PT) independent  -SERGIO     Distance (Gait Training Goal 1, PT) 300  -SERGIO     Time Frame (Gait Training Goal 1, PT) long term goal (LTG);10 days  -SERGIO       Row Name 07/26/24 1000          ROM Goal 1 (PT)    ROM Goal 1 (PT) Pt will demonstrate knee ROM from 0-110° on the affected side.  -SERGIO     Time Frame (ROM Goal 1, PT) long-term goal (LTG);10 days  -SERGIO       Row Name 07/26/24 1000          Strength Goal 1 (PT)    Strength Goal 1 (PT) Pt will demonstrate knee extension strength of 5/5 on the affected side.  -SERGIO     Time Frame (Strength Goal 1, PT) long term goal (LTG);10 days  -SERGIO               User Key  (r) = Recorded By, (t) = Taken By, (c) = Cosigned By      Initials Name Provider Type    Robert Miramontes, RN Registered Nurse    Raphael Jacobs, PT  Physical Therapist    Jo Ann Puente, PT Student PT Student                    Physical Therapy Education       Title: PT OT SLP Therapies (Done)       Topic: Physical Therapy (Done)       Point: Mobility training (Done)       Learning Progress Summary             Patient Acceptance, E,TB, VU by SERGIO at 7/26/2024 1039                         Point: Precautions (Done)       Learning Progress Summary             Patient Acceptance, E,TB, VU by SERGIO at 7/26/2024 1039                                         User Key       Initials Effective Dates Name Provider Type Discipline    SERGIO 06/03/21 -  Raphael Paniagua PT Physical Therapist PT                  PT Recommendation and Plan     Progress Summary (PT)  Daily Progress Summary (PT): (P) Pt is progressing well with strength, ROM, and overall function.  Continue to progress as tolerated and with POC.   Outcome Measures       Row Name 07/26/24 1400 07/26/24 1000          How much help from another person do you currently need...    Turning from your back to your side while in flat bed without using bedrails? 3 (P)   -TL 3  -SERGIO     Moving from lying on back to sitting on the side of a flat bed without bedrails? 3 (P)   -TL 3  -SERGIO     Moving to and from a bed to a chair (including a wheelchair)? 3 (P)   -TL 3  -SERGIO     Standing up from a chair using your arms (e.g., wheelchair, bedside chair)? 3 (P)   -TL 3  -SERGIO     Climbing 3-5 steps with a railing? 3 (P)   -TL 3  -SERGIO     To walk in hospital room? 3 (P)   -TL 3  -SERGIO     AM-PAC 6 Clicks Score (PT) 18 (P)   -TL 18  -SERGIO     Highest Level of Mobility Goal 6 --> Walk 10 steps or more (P)   -TL 6 --> Walk 10 steps or more  -SERGIO        Functional Assessment    Outcome Measure Options AM-PAC 6 Clicks Basic Mobility (PT) (P)   -TL AM-PAC 6 Clicks Basic Mobility (PT)  -SERGIO               User Key  (r) = Recorded By, (t) = Taken By, (c) = Cosigned By      Initials Name Provider Type    SERGIO Raphael Paniagua, PT Physical Therapist    ELIAS Caban  Jo Ann PT Student PT Student                     Time Calculation:    PT Charges       Row Name 07/26/24 1432 07/26/24 1036          Time Calculation    PT Received On 07/26/24 (P)   -TL 07/26/24  -SERGIO     PT Goal Re-Cert Due Date -- 08/04/24  -SERGIO        Timed Charges    45624 - PT Therapeutic Exercise Minutes 15 (P)   -TL --     72654 - Gait Training Minutes  15 (P)   -TL --        Untimed Charges    PT Eval/Re-eval Minutes -- 30  -SERGIO        Total Minutes    Timed Charges Total Minutes 30 (P)   -TL --     Untimed Charges Total Minutes -- 30  -SERGIO      Total Minutes 30 (P)   -TL 30  -SERGIO               User Key  (r) = Recorded By, (t) = Taken By, (c) = Cosigned By      Initials Name Provider Type    SERGIO Raphael Paniagua, PT Physical Therapist    Jo Ann Puente, PT Student PT Student                      PT G-Codes  Outcome Measure Options: (P) AM-PAC 6 Clicks Basic Mobility (PT)  AM-PAC 6 Clicks Score (PT): (P) 18    Jo Ann Caban PT Student  7/26/2024

## 2024-07-26 NOTE — ANESTHESIA POSTPROCEDURE EVALUATION
Patient: Lexus Tejada    Procedure Summary       Date: 07/26/24 Room / Location: [facility] OR 06 / [facility] MAIN OR    Anesthesia Start: 0759 Anesthesia Stop: 0946    Procedure: LEFT TOTAL KNEE ARTHROPLASTY WITH JUANCHO ROBOT. (Left: Knee) Diagnosis:       Pain in both knees, unspecified chronicity      Osteoarthritis of both knees, unspecified osteoarthritis type      (Pain in both knees, unspecified chronicity [M25.561, M25.562])      (Osteoarthritis of both knees, unspecified osteoarthritis type [M17.0])    Surgeons: Juancho Fontaine MD Provider: Lio Winters MD    Anesthesia Type: general, regional ASA Status: 2            Anesthesia Type: general, regional    Vitals  Vitals Value Taken Time   /78 07/26/24 1017   Temp 36.3 °C (97.4 °F) 07/26/24 1010   Pulse 73 07/26/24 1020   Resp 18 07/26/24 1010   SpO2 96 % 07/26/24 1020   Vitals shown include unfiled device data.        Post Anesthesia Care and Evaluation    Patient location during evaluation: bedside  Patient participation: complete - patient participated  Level of consciousness: awake  Pain management: adequate    Airway patency: patent  PONV Status: none  Cardiovascular status: acceptable and stable  Respiratory status: acceptable  Hydration status: acceptable

## 2024-07-26 NOTE — SIGNIFICANT NOTE
07/26/24 1032   Plan   Final Discharge Disposition Code 01 - home or self-care   Final Note [facility]. Appt: 7/29/24 at 11 AM.

## 2024-07-26 NOTE — H&P
"Pain and Follow-up of the Left Knee and Pain and Follow-up of the Right Knee        Subjective  Lexus Tejada presents to [facility] for follow up of her left knee. Patient has bilateral knee pain and severe osteoarthritis that she has tried managing for years. She was last seen in office on 6/3/24 and received bilateral knee steroid injections and states she didn't get any relief. She reports the knee pain and issues she has been dealing with for over 20 years. She has tried taking anti-inflammatories, exercises, multiples rounds of PT, and now injections again. She is ready to proceed with knee replacement.       Allergies         Allergies   Allergen Reactions    Lisinopril Anaphylaxis    Penicillin G Other (See Comments)       Patient states when she was a kid                  Objective  Vital Signs:   Vitals       Vitals:     07/03/24 1431   BP: 165/93   Pulse: 73   SpO2: 97%   Weight: 88.9 kg (195 lb 15.8 oz)   Height: 154.9 cm (61\")         Body mass index is 37.03 kg/m².     I reviewed the patient's chief complaint, history of present illness, review of systems, past medical history, surgical history, family history, social history, medications, and allergy list.      REVIEW OF SYSTEMS     Constitutional: Denies fevers, chills, weight loss  Cardiovascular: Denies chest pain, shortness of breath  Skin: Denies rashes, acute skin changes  Neurologic: Denies headache, loss of consciousness  MSK: bilareral knee pain .      Ortho Exam  Knee   General: Alert, no acute distress.   Left Knee: No pain with passive hip ROM.  Knee extensor mechanism intact. Full knee extension. Flexion to 125 degrees. Calf soft, non-tender.  Sensation and neurovascularly intact.  Demonstrates active ankle dorsiflexion and plantarflexion.  Palpable pedal pulses.                   Imaging Results (Most Recent)         None                      Assessment  Assessment and Plan   Diagnoses and all orders " for this visit:     1. Pain in both knees, unspecified chronicity (Primary)     2. Osteoarthritis of both knees, unspecified osteoarthritis type           Lexus Tejada presents to [facility] for her bilateral knees. Patient has bilateral knee arthritis that she has tried treating conservatively for years. She reports she has tried PT, home exercises, weight loss, NSAIDs, and intermittent injections. She reports none of the conservative treatment has improved her knee pain or quality of life. She is ready to proceed with left total knee arthroplasty.      Discussed surgery with the patient. Risks/benefits discussed with patient including, but not limited to: infection, bleeding, neurovascular damage, malunion, nonunion, aesthetic deformity, need for further surgery, and death. Discussed with patient the implant type being used during surgery. Patient understands and desires to proceed. Surgery pamphlet provided to patient. Patient is to meet with our surgery scheduler at check out and proceed with scheduling of surgery.      All questions and concerns were addressed and answered. Patient left the office without any additional questions.

## 2024-07-26 NOTE — CONSULTS
[facility]   HOSPITALIST HISTORY AND PHYSICAL  Date: 2024   Patient Name: Lexus Tejada  : 1975  MRN: [id number]  Primary Care Physician:  Torrie Pino APRN  Date of admission: 2024    Subjective   Subjective   Chief Complaint: Medical management    HPI:    Lexus Tejada is a 48 y.o. female who is being seen by hospitalist for general medical management of chronic medical issues including migraines, anemia, HTN, hypothyroidism, OA knees.  She had left knee replacement 24 by Dr. Fontaine.  Currently resting in recliner.   at bedside.  Left knee pain reasonably controlled.  Has taken a few steps with her new left knee.  Denies any nausea or vomiting.  No chest pains palpitations or syncope.  Recent hemoglobin 8.6.  Pertinent History   Past Medical History:    Active Ambulatory Problems     Diagnosis Date Noted    Pain in both knees 2024    Osteoarthritis of both knees 2024     Resolved Ambulatory Problems     Diagnosis Date Noted    No Resolved Ambulatory Problems     Past Medical History:   Diagnosis Date    Arthritis     Back pain     Disease of thyroid gland     Hypertension     Iron deficiency anemia     Migraines        Past Surgical History:    Past Surgical History:   Procedure Laterality Date    CHOLECYSTECTOMY      LAPAROSCOPIC TUBAL LIGATION         Social History:   .  Lives with  [facility].  Non-smoker  Employed.  Factory work.  Social History     Socioeconomic History    Marital status:    Tobacco Use    Smoking status: Never    Smokeless tobacco: Never   Vaping Use    Vaping status: Never Used   Substance and Sexual Activity    Alcohol use: Never    Drug use: Never    Sexual activity: Defer       Family History:     History reviewed. No pertinent family history.    Home Medications (reported)  Current Outpatient Medications   Medication Instructions    bisoprolol-hydrochlorothiazide (ZIAC) 10-6.25 MG per  tablet 1 tablet, Oral, Daily    Cholecalciferol (Vitamin D3) 1.25 MG (81058 UT) capsule TAKE 1 CAPSULE BY MOUTH WEEKLY ON THE SAME DAY EACH WEEK    ferrous sulfate 324 (65 Fe) MG tablet delayed-release EC tablet 1 tablet, Oral, Every 48 Hours Scheduled    gabapentin (NEURONTIN) 300 MG capsule Take 1 capsule by mouth At Night As Needed (PAIN).    ibuprofen (ADVIL,MOTRIN) 200 mg, Oral, Every 6 Hours PRN    levothyroxine (SYNTHROID, LEVOTHROID) 112 MCG tablet     losartan (COZAAR) 25 MG tablet Take 1 tablet by mouth Daily. TAKES AT 5PM    Melatonin 10 MG chewable tablet Oral    tiZANidine (ZANAFLEX) 4 MG tablet 1 tablet, Oral, 3 times daily       Allergies:  Allergies   Allergen Reactions    Lisinopril Anaphylaxis    Penicillin G Other (See Comments)     Patient states when she was a kid    Alpha-Gal Nausea Only       REVIEW OF SYSTEMS:   Left knee pain    Objective   Objective   Vitals:   Temp:  [96.6 °F (35.9 °C)-98.1 °F (36.7 °C)] 98.1 °F (36.7 °C)  Heart Rate:  [69-90] 70  Resp:  [14-18] 18  BP: (125-162)/(68-82) 162/82  PHYSICAL EXAM   CON: WN. WD. NAD.  Pleasant and polite.  NECK:  No thyromegaly. No stridor. Trachea midline.  RESP:  CTA. No wheezes. No crackles.  No work of breathing or tachypnea.   CV:  Rhythm regular. Rate WNL. No murmur noted.  No edema.  GI:  Soft and nontender. Nondistended.  EXT: Left knee dressing intact  PSYCH:  Alert. Oriented. Normal affect and mood.  NEURO:  No dysarthria or aphasia. No unilateral weakness or paresthesia.  SKIN: No chronic venous stasis changes or varicosities.  No cellulitis    Result Review    Result Review:  I have personally reviewed the results from the time of this admission to 7/26/2024 10:53 EDT and agree with these findings:  []  Laboratory  []  Microbiology  []  Radiology  []  EKG/Telemetry   []  Cardiology/Vascular   []  Pathology  []  Old records  []  Other:    Assessment & Plan   Assessment / Plan   Assessment:    Medical  management  HTN  Migraines  Hypothyroidism  Anemia with history of fibroids and heavy menses.  Allergic to lisinopril/ACE inhibitors  OA left knee  Left knee replacement 7/26/24 by Dr. Fontaine     Plan:    Gentle IV fluid hydration.  Monitor blood pressure trend.  Check BMP in the morning.  Check CBC in the morning.  Check iron panel folate B12.  Hold home HCTZ, losartan, NSAIDs.  Already took Ziac this morning.  Plan to continue beta-blocker tomorrow morning pending blood pressure  Melatonin gabapentin at night ordered.  Daily PT recommended  Pain med Rx per orthopedist  DVT prophylaxis per orthopedist    VTE Prophylaxis:  Pharmacologic VTE prophylaxis orders are signed & held. Mechanical VTE prophylaxis orders are present.      CODE STATUS:         Electronically signed by DIOR Rasmussen, 07/26/24, 10:53 AM EDT.

## 2024-07-26 NOTE — THERAPY EVALUATION
Acute Care - Physical Therapy Initial Evaluation   Luz     Patient Name: Lexus Tejada  : 1975  MRN: [id number]  Today's Date: 2024     Admit date: 2024     Referring Physician: Juancho Hodges MD     Surgery Date:2024   Procedure(s) (LRB):  LEFT TOTAL KNEE ARTHROPLASTY WITH JUANCHO ROBOT. (Left)          Visit Dx:     ICD-10-CM ICD-9-CM   1. Difficulty in walking  R26.2 719.7   2. Pain in both knees, unspecified chronicity  M25.561 719.46    M25.562    3. Osteoarthritis of both knees, unspecified osteoarthritis type  M17.0 715.96     Patient Active Problem List   Diagnosis    Pain in both knees    Osteoarthritis of both knees    Primary osteoarthritis of left knee     Past Medical History:   Diagnosis Date    Arthritis     Back pain     Disease of thyroid gland     Hypertension     Iron deficiency anemia     Migraines      Past Surgical History:   Procedure Laterality Date    CHOLECYSTECTOMY      LAPAROSCOPIC TUBAL LIGATION       PT Assessment (Last 12 Hours)       PT Evaluation and Treatment       Row Name 24 1000          Physical Therapy Time and Intention    Subjective Information complains of;pain  -SERGIO     Document Type evaluation  -SERGIO     Mode of Treatment individual therapy;physical therapy  -SERGIO     Patient Effort good  -SERGIO     Symptoms Noted During/After Treatment none  -SERGIO       Row Name 24 1000          General Information    Patient Observations alert;cooperative;agree to therapy  -SERGIO     Prior Level of Function independent:;all household mobility;community mobility  -SERGIO     Equipment Currently Used at Home none  -SERGIO     Existing Precautions/Restrictions fall;weight bearing  -SERGIO     Barriers to Rehab none identified  -SERGIO       Row Name 24 1000          Living Environment    Current Living Arrangements home  -SERGIO     People in Home spouse  -SERGIO       Row Name 24 1000          Cognition    Orientation Status (Cognition) oriented x 3  -SERGIO        Row Name 07/26/24 1000          Range of Motion Comprehensive    General Range of Motion lower extremity range of motion deficits identified  15-50° L knee limited by pain  -SERGIO       Row Name 07/26/24 1000          Strength Comprehensive (MMT)    General Manual Muscle Testing (MMT) Assessment lower extremity strength deficits identified  LLE3+/5  -SERGIO       [facility] Name 07/26/24 1000          Bed Mobility    Bed Mobility bed mobility (all) activities;supine-sit  -SERGIO     All Activities, [place] (Bed Mobility) contact guard  -SERGIO     Supine-Sit [place]ullen (Bed Mobility) contact guard  -SERGIO       [facility] Name 07/26/24 1000          Transfers    Transfers bed-chair transfer;sit-stand transfer  -SERGIO       [facility] Name 07/26/24 1000          Bed-Chair Transfer    Bed-Chair [place]ullen (Transfers) contact guard  -SERGIO     Assistive Device (Bed-Chair Transfers) walker, front-wheeled  -SERGIO       [facility] Name 07/26/24 1000          Sit-Stand Transfer    Sit-Stand [place] (Transfers) contact guard  -SERGIO     Assistive Device (Sit-Stand Transfers) walker, front-wheeled  -SERGIO       [facility] Name 07/26/24 1000          Gait/Stairs (Locomotion)    Gait/Stairs Locomotion gait/ambulation assistive device  -SERGIO     [place] Level (Gait) contact guard  -SERGIO     Assistive Device (Gait) walker, front-wheeled  -SERGIO     Patient was able to Ambulate yes  -SERGIO     Distance in Feet (Gait) 25  -SERGIO     Pattern (Gait) step-to  -SERGIO       [facility] Name 07/26/24 1000          Safety Issues, Functional Mobility    Impairments Affecting Function (Mobility) balance;pain;range of motion (ROM);strength  -SERGIO       [facility] Name 07/26/24 1000          Balance    Balance Assessment standing dynamic balance  -SERGIO     Dynamic Standing Balance contact guard  -SERGIO     Position/Device Used, Standing Balance walker, front-wheeled  -SERGIO       Row Name             Wound 07/26/24 0841 Left anterior knee Incision    Wound - Properties Group Placement Date: 07/26/24  -BW Placement Time: 0841  -BW  Present on Original Admission: N  -BW Side: Left  -BW Orientation: anterior  -BW Location: knee  -BW Primary Wound Type: Incision  -BW    Retired Wound - Properties Group Placement Date: 07/26/24  -BW Placement Time: 0841  -BW Present on Original Admission: N  -BW Side: Left  -BW Orientation: anterior  -BW Location: knee  -BW Primary Wound Type: Incision  -BW    Retired Wound - Properties Group Date first assessed: 07/26/24  -BW Time first assessed: 0841  -BW Present on Original Admission: N  -BW Side: Left  -BW Location: knee  -BW Primary Wound Type: Incision  -BW      Row Name             Wound 07/26/24 0845 Left proximal leg Incision    Wound - Properties Group Placement Date: 07/26/24  -BW Placement Time: 0845  -BW Present on Original Admission: N  -BW Side: Left  -BW Orientation: proximal  -BW Location: leg  -BW Primary Wound Type: Incision  -BW    Retired Wound - Properties Group Placement Date: 07/26/24  -BW Placement Time: 0845  -BW Present on Original Admission: N  -BW Side: Left  -BW Orientation: proximal  -BW Location: leg  -BW Primary Wound Type: Incision  -BW    Retired Wound - Properties Group Date first assessed: 07/26/24  -BW Time first assessed: 0845  -BW Present on Original Admission: N  -BW Side: Left  -BW Location: leg  -BW Primary Wound Type: Incision  -BW      Row Name 07/26/24 1000          Plan of Care Review    Plan of Care Reviewed With patient  -SERGIO     Outcome Evaluation Pt presents with decreased ROM, strength, transfers and ambulation.  Skilled PT services will be required to address these mobility deficits.  -SERGIO       Row Name 07/26/24 1000          Therapy Assessment/Plan (PT)    Patient/Family Therapy Goals Statement (PT) Walk without pain  -SERGIO     Rehab Potential (PT) good, to achieve stated therapy goals  -SERGIO     Criteria for Skilled Interventions Met (PT) skilled treatment is necessary  -SERGIO     Therapy Frequency (PT) 2 times/day  -SERGIO     Predicted Duration of Therapy Intervention  (PT) 10 days  -SERGIO     Problem List (PT) problems related to;balance;mobility;range of motion (ROM);strength;pain  -SERGIO     Activity Limitations Related to Problem List (PT) unable to ambulate safely;unable to transfer safely  -SERGIO       Row Name 07/26/24 1000          PT Evaluation Complexity    History, PT Evaluation Complexity no personal factors and/or comorbidities  -SERGIO     Examination of Body Systems (PT Eval Complexity) total of 4 or more elements  -SERGIO     Clinical Presentation (PT Evaluation Complexity) stable  -SERGIO     Clinical Decision Making (PT Evaluation Complexity) low complexity  -SERGIO     Overall Complexity (PT Evaluation Complexity) low complexity  -SERGIO       Row Name 07/26/24 1000          Therapy Plan Review/Discharge Plan (PT)    Therapy Plan Review (PT) evaluation/treatment results reviewed;participants voiced agreement with care plan;participants included;patient  -SERGIO       Row Name 07/26/24 1000          Physical Therapy Goals    Transfer Goal Selection (PT) transfer, PT goal 1  -SERGIO     Gait Training Goal Selection (PT) gait training, PT goal 1  -SERGIO     ROM Goal Selection (PT) ROM, PT goal 1  -SERGIO     Strength Goal Selection (PT) strength, PT goal 1  -SERGIO       Row Name 07/26/24 1000          Transfer Goal 1 (PT)    Activity/Assistive Device (Transfer Goal 1, PT) transfers, all  -SERGIO     [place] Level/Cues Needed (Transfer Goal 1, PT) independent  -SERGIO     Time Frame (Transfer Goal 1, PT) long term goal (LTG);10 days  -SERGIO       Row Name 07/26/24 1000          Gait Training Goal 1 (PT)    Activity/Assistive Device (Gait Training Goal 1, PT) gait (walking locomotion);walker, rolling  -SERGIO     [place] Level (Gait Training Goal 1, PT) independent  -SERGIO     Distance (Gait Training Goal 1, PT) 300  -SERGIO     Time Frame (Gait Training Goal 1, PT) long term goal (LTG);10 days  -SERGIO       Row Name 07/26/24 1000          ROM Goal 1 (PT)    ROM Goal 1 (PT) Pt will demonstrate knee ROM from 0-110° on the  affected side.  -SERGIO     Time Frame (ROM Goal 1, PT) long-term goal (LTG);10 days  -SERGIO       Row Name 07/26/24 1000          Strength Goal 1 (PT)    Strength Goal 1 (PT) Pt will demonstrate knee extension strength of 5/5 on the affected side.  -SERGIO     Time Frame (Strength Goal 1, PT) long term goal (LTG);10 days  -SERGIO               User Key  (r) = Recorded By, (t) = Taken By, (c) = Cosigned By      Initials Name Provider Type    Robert Miramontes, RN Registered Nurse    Raphael Jacobs PT Physical Therapist                    Physical Therapy Education       Title: PT OT SLP Therapies (Done)       Topic: Physical Therapy (Done)       Point: Mobility training (Done)       Learning Progress Summary             Patient Acceptance, E,TB, VU by SERGIO at 7/26/2024 1039                         Point: Precautions (Done)       Learning Progress Summary             Patient Acceptance, E,TB, VU by SERGIO at 7/26/2024 1039                                         User Key       Initials Effective Dates Name Provider Type Discipline    SERGIO 06/03/21 -  Raphael Paniagua PT Physical Therapist PT                  PT Recommendation and Plan  Anticipated Discharge Disposition (PT): home with outpatient therapy services  Planned Therapy Interventions (PT): balance training, bed mobility training, gait training, home exercise program, stair training, ROM (range of motion), strengthening, stretching, transfer training  Therapy Frequency (PT): 2 times/day  Plan of Care Reviewed With: patient  Outcome Evaluation: Pt presents with decreased ROM, strength, transfers and ambulation.  Skilled PT services will be required to address these mobility deficits.   Outcome Measures       Row Name 07/26/24 1000             How much help from another person do you currently need...    Turning from your back to your side while in flat bed without using bedrails? 3  -SERGIO      Moving from lying on back to sitting on the side of a flat bed without bedrails? 3   -SERGIO      Moving to and from a bed to a chair (including a wheelchair)? 3  -SERGIO      Standing up from a chair using your arms (e.g., wheelchair, bedside chair)? 3  -SERGIO      Climbing 3-5 steps with a railing? 3  -SERGIO      To walk in hospital room? 3  -SERGIO      AM-PAC 6 Clicks Score (PT) 18  -SERGIO      Highest Level of Mobility Goal 6 --> Walk 10 steps or more  -SERGIO         Functional Assessment    Outcome Measure Options AM-PAC 6 Clicks Basic Mobility (PT)  -SERGIO                User Key  (r) = Recorded By, (t) = Taken By, (c) = Cosigned By      Initials Name Provider Type    Raphael Jacobs, PT Physical Therapist                     Time Calculation:    PT Charges       Row Name 07/26/24 1036             Time Calculation    PT Received On 07/26/24  -SERGIO      PT Goal Re-Cert Due Date 08/04/24  -SERGIO         Untimed Charges    PT Eval/Re-eval Minutes 30  -SERGIO         Total Minutes    Untimed Charges Total Minutes 30  -SERGIO       Total Minutes 30  -SERGIO                User Key  (r) = Recorded By, (t) = Taken By, (c) = Cosigned By      Initials Name Provider Type    Raphael Jacobs, PT Physical Therapist                      PT G-Codes  Outcome Measure Options: AM-PAC 6 Clicks Basic Mobility (PT)  AM-PAC 6 Clicks Score (PT): 18    Raphael Paniagua, PT  7/26/2024

## 2024-07-26 NOTE — OP NOTE
TOTAL KNEE ARTHROPLASTY WITH JUANCHO ROBOT  Procedure Report    Patient Name:  Lexus Tejada  YOB: 1975    Date of Surgery:  7/26/2024       Pre-op Diagnosis:   Pain in both knees, unspecified chronicity [M25.561, M25.562]  Osteoarthritis of both knees, unspecified osteoarthritis type [M17.0]       Post-Op Diagnosis Codes:     * Pain in both knees, unspecified chronicity [M25.561, M25.562]     * Osteoarthritis of both knees, unspecified osteoarthritis type [M17.0]    Procedure/CPT® Codes:      Procedure(s):  LEFT TOTAL KNEE ARTHROPLASTY WITH JUANCHO ROBOT.    Surgical Approach: Knee Medial Parapatellar        Staff:  Surgeon(s):  Juancho Fontaine MD    Assistant: Gaurav Givens    Anesthesia: General with Block    Estimated Blood Loss: 50ml    Implants:    Implant Name Type Inv. Item Serial No.  Lot No. LRB No. Used Action   CMT BONE PALACOS R HI/VISC 1X40 - [id number] Implant CMT BONE PALACOS R HI/VISC 1X40  [facility] MEDICAL [id number] Left 1 Implanted   CMT BONE PALACOS R HI/VISC 1X40 - [id number] Implant CMT BONE PALACOS R HI/VISC 1X40  [facility] [id number] Left 1 Implanted   ART/SRF KN PERSONA/VE PS CD/CR 6TO7 10MM LT - [id number] Implant ART/SRF KN PERSONA/VE PS CD/CR 6TO7 10MM LT  [employer] [id number] Left 1 Implanted   PAT KN PERSONA ALLPOLY CMT 7.5X26MM - [id number] Implant PAT KN PERSONA ALLPOLY CMT 7.5X26MM  [employer] [id number] Left 1 Implanted   COMP FEM/KN PERSONA CR CMT NRW SZ7 LT - [id number] Implant COMP FEM/KN PERSONA CR CMT NRW SZ7 LT  [employer] [id number] Left 1 Implanted   STEM TIB/KN PERSONA CMT 5D SZC LT - [id number] Implant STEM TIB/KN PERSONA CMT 5D SZC LT  [employer] [id number] Left 1 Implanted       Specimen:          None    Findings: See description    Complications: none    Description of Procedure: Patient was taken to the operating room placed supine operating table after abductor canal blocks and in preoperative holding.  After general tracheal  anesthesia was established the left knee and lower extremity were prepped and draped in standard surgical fashion using alcohol ChloraPrep.  The Shell robot was also draped sterilely and calibrated.  Incision was made 2 fingerbreadth superior superior pole of the patella down to the medial aspect of tibial tubercle the knife and full-thickness skin flaps were raised laterally and medially.  A medial parapatellar arthrotomy was then undertaken and the knee was brought into flexion.  Retractors were placed to protect protect the collateral ligaments.  Soft tissue releases and osteophytes removed as deemed necessary.  Then the tracking device was mounted on the distal femur in a standard fashion as well as through separate stab incisions in the distal tibia 5 fingerbreadths inferior to the tibial tubercle.  Then all the femoral points were mapped out using the Shell software the tibial points were mapped out as well.  While the plan for resection was being completed the patella was everted calipered and cut to the appropriate thickness.  The correct size patella was chosen in the locals for the patella were reamed and a trial patella was placed and the knee was brought back into flexion.  The distal femoral cutting block was then brought in using robotic assisted arm and the distal femoral cut was made it was checked and verified and accepted.  Then the external rotation arm of the robot was used to pin the distal femur and the correct external rotation decided by the intraoperative plan using the previously mapped points.  Then the tibia was cut after removing the robotic arm and to the appropriate position to cut the tibia the cutting block was pinned and the proximal tibia cut was made excess bone from the cut was removed and the 4-in-1 cutting block was then used in the distal femur.  The tibial cut was verified and accepted femoral and tibial trials were then placed and the knee was taken through range of motion  verifying flexion extension gaps and extension and flexion range of motion to be acceptable by using the software in a standard fashion.  Locals for the femur were then drilled the trials were removed the bone ends were copiously irrigated with bacitracin Simpulse irrigation.  The tibia was cemented in place according to marked external rotation from the trials the femur was cemented in position second and then the real polychosen was placed.  Excess cement removed from the implant edges the knee was held in extension until the cement dried and the patella was cemented into place and held with a patellar clamp.  After the cement hardened excess management from the implant edges Irrisept was used and wound was copiously irrigated the knee was taken through range of motion and checked 1 last time the patella tracked in the center of the trochlear groove the femur using no thumbs technique.  Then the arthrotomy was closed in 45 degrees of flexion with Ethibond the deep fat was closed 0 Vicryl subcu was closed with 2-0 Vicryl and the skin was closed with staples incision was washed and dried and sterile dressings were applied the patient tolerated the procedure well and was taken to recovery room.       Juancho Fontaine MD     Date: 7/26/2024  Time: 09:41 EDT

## 2024-07-26 NOTE — ANESTHESIA PREPROCEDURE EVALUATION
Anesthesia Evaluation     Patient summary reviewed and Nursing notes reviewed   no history of anesthetic complications:   NPO Solid Status: > 8 hours  NPO Liquid Status: > 2 hours           Airway   Mallampati: II  TM distance: >3 FB  Neck ROM: full  No difficulty expected  Dental    (+) poor dentition    Pulmonary - negative pulmonary ROS and normal exam    breath sounds clear to auscultation  Cardiovascular - normal exam  Exercise tolerance: good (4-7 METS)    Rhythm: regular  Rate: normal    (+) hypertension      Neuro/Psych- negative ROS  (+) Parkinson's disease  GI/Hepatic/Renal/Endo    (+) obesity, thyroid problem hypothyroidism    Musculoskeletal     (+) back pain  Abdominal    Substance History - negative use     OB/GYN negative ob/gyn ROS         Other   arthritis,     ROS/Med Hx Other: >4METS,HX HTN,. HGB/HCT 8.6/29.2, PLTs 382. PT STATES HEAVY MENSES. TJR. KT     Pt had si/sx consistent with alpha gal years ago that have since gone away.  She states she has no meat/dietary restrictions                 Anesthesia Plan    ASA 2     general and regional     (Patient understands anesthesia not responsible for dental damage. Regional anesthesia options discussed with patient. Pt accepts regional block.)  intravenous induction     Anesthetic plan, risks, benefits, and alternatives have been provided, discussed and informed consent has been obtained with: patient.    Use of blood products discussed with patient .    Plan discussed with CRNA.    CODE STATUS:

## 2024-07-26 NOTE — ANESTHESIA PROCEDURE NOTES
Peripheral Block      Patient reassessed immediately prior to procedure    Patient location during procedure: pre-op  Start time: 7/26/2024 7:47 AM  Stop time: 7/26/2024 7:49 AM  Reason for block: at surgeon's request and post-op pain management  Performed by  Anesthesiologist: Lio Winters MD  Preanesthetic Checklist  Completed: patient identified, IV checked, site marked, risks and benefits discussed, surgical consent, monitors and equipment checked, pre-op evaluation and timeout performed  Prep:  Pt Position: supine  Sterile barriers:alcohol skin prep, partial drape, cap, washed/disinfected hands, mask and gloves  Prep: ChloraPrep  Patient monitoring: blood pressure monitoring, continuous pulse oximetry and EKG  Procedure    Sedation: yes  Performed under: local infiltration  Guidance:ultrasound guided and nerve stimulator    ULTRASOUND INTERPRETATION.  Using ultrasound guidance a 20 G gauge needle was placed in close proximity to the nerve, at which point, under ultrasound guidance anesthetic was injected in the area of the nerve and spread of the anesthesia was seen on ultrasound in close proximity thereto.  There were no abnormalities seen on ultrasound; a digital image was taken; and the patient tolerated the procedure with no complications. Images:still images obtained, printed/placed on chart    Laterality:left  Block Type:adductor canal block  Injection Technique:single-shot  Needle Type:echogenic  Needle Gauge:20 G (4in)  Resistance on Injection: none    Medications Used: ropivacaine (NAROPIN) 0.5 % injection - Injection   30 mL - 7/26/2024 7:49:00 AM      Post Assessment  Injection Assessment: negative aspiration for heme, no paresthesia on injection and incremental injection  Patient Tolerance:comfortable throughout block  Complications:no  Additional Notes  The block or continuous infusion is requested by the referring physician for management of postoperative pain, or pain related to a procedure.  Ultrasound guidance (deemed medically necessary). Painless injection, pt was awake and conversant during the procedure without complications. Needle and surrounding structures visualized throughout procedure. No adverse reactions or complications seen during this period. Post-procedure image showed no signs of complication, and anatomy was consistent with an uncomplicated nerve blockade.  Performed by: Lio Winters MD

## 2024-07-27 VITALS
HEIGHT: 61 IN | DIASTOLIC BLOOD PRESSURE: 81 MMHG | TEMPERATURE: 98.1 F | RESPIRATION RATE: 16 BRPM | HEART RATE: 70 BPM | WEIGHT: 191.8 LBS | SYSTOLIC BLOOD PRESSURE: 153 MMHG | BODY MASS INDEX: 36.21 KG/M2 | OXYGEN SATURATION: 99 %

## 2024-07-27 LAB
ANION GAP SERPL CALCULATED.3IONS-SCNC: 7.8 MMOL/L (ref 5–15)
BUN SERPL-MCNC: 10 MG/DL (ref 6–20)
BUN/CREAT SERPL: 14.1 (ref 7–25)
CALCIUM SPEC-SCNC: 8.2 MG/DL (ref 8.6–10.5)
CHLORIDE SERPL-SCNC: 102 MMOL/L (ref 98–107)
CO2 SERPL-SCNC: 25.2 MMOL/L (ref 22–29)
CREAT SERPL-MCNC: 0.71 MG/DL (ref 0.57–1)
DEPRECATED RDW RBC AUTO: 42.2 FL (ref 37–54)
EGFRCR SERPLBLD CKD-EPI 2021: 105 ML/MIN/1.73
ERYTHROCYTE [DISTWIDTH] IN BLOOD BY AUTOMATED COUNT: 16.9 % (ref 12.3–15.4)
FOLATE SERPL-MCNC: 4.57 NG/ML (ref 4.78–24.2)
GLUCOSE SERPL-MCNC: 98 MG/DL (ref 65–99)
HCT VFR BLD AUTO: 26.1 % (ref 34–46.6)
HGB BLD-MCNC: 7.5 G/DL (ref 12–15.9)
IRON 24H UR-MRATE: 12 MCG/DL (ref 37–145)
IRON SATN MFR SERPL: 3 % (ref 20–50)
MCH RBC QN AUTO: 19.8 PG (ref 26.6–33)
MCHC RBC AUTO-ENTMCNC: 28.7 G/DL (ref 31.5–35.7)
MCV RBC AUTO: 68.9 FL (ref 79–97)
PLATELET # BLD AUTO: 337 10*3/MM3 (ref 140–450)
PMV BLD AUTO: 10.2 FL (ref 6–12)
POTASSIUM SERPL-SCNC: 3.8 MMOL/L (ref 3.5–5.2)
RBC # BLD AUTO: 3.79 10*6/MM3 (ref 3.77–5.28)
SODIUM SERPL-SCNC: 135 MMOL/L (ref 136–145)
TIBC SERPL-MCNC: 443 MCG/DL (ref 298–536)
TRANSFERRIN SERPL-MCNC: 297 MG/DL (ref 200–360)
VIT B12 BLD-MCNC: 352 PG/ML (ref 211–946)
WBC NRBC COR # BLD AUTO: 10.76 10*3/MM3 (ref 3.4–10.8)

## 2024-07-27 PROCEDURE — 82746 ASSAY OF FOLIC ACID SERUM: CPT | Performed by: PHYSICIAN ASSISTANT

## 2024-07-27 PROCEDURE — 83540 ASSAY OF IRON: CPT | Performed by: PHYSICIAN ASSISTANT

## 2024-07-27 PROCEDURE — 84466 ASSAY OF TRANSFERRIN: CPT | Performed by: PHYSICIAN ASSISTANT

## 2024-07-27 PROCEDURE — 97535 SELF CARE MNGMENT TRAINING: CPT

## 2024-07-27 PROCEDURE — 97165 OT EVAL LOW COMPLEX 30 MIN: CPT

## 2024-07-27 PROCEDURE — 25010000002 KETOROLAC TROMETHAMINE PER 15 MG: Performed by: ORTHOPAEDIC SURGERY

## 2024-07-27 PROCEDURE — 25010000002 NA FERRIC GLUC CPLX PER 12.5 MG: Performed by: PHYSICIAN ASSISTANT

## 2024-07-27 PROCEDURE — 80048 BASIC METABOLIC PNL TOTAL CA: CPT | Performed by: PHYSICIAN ASSISTANT

## 2024-07-27 PROCEDURE — 97116 GAIT TRAINING THERAPY: CPT

## 2024-07-27 PROCEDURE — 85027 COMPLETE CBC AUTOMATED: CPT | Performed by: PHYSICIAN ASSISTANT

## 2024-07-27 PROCEDURE — 99214 OFFICE O/P EST MOD 30 MIN: CPT | Performed by: PHYSICIAN ASSISTANT

## 2024-07-27 PROCEDURE — 97150 GROUP THERAPEUTIC PROCEDURES: CPT

## 2024-07-27 PROCEDURE — 82607 VITAMIN B-12: CPT | Performed by: PHYSICIAN ASSISTANT

## 2024-07-27 RX ORDER — ASPIRIN 325 MG
325 TABLET ORAL DAILY
Qty: 21 TABLET | Refills: 1 | Status: SHIPPED | OUTPATIENT
Start: 2024-07-27

## 2024-07-27 RX ORDER — LANOLIN ALCOHOL/MO/W.PET/CERES
400 CREAM (GRAM) TOPICAL DAILY
Qty: 30 TABLET | Refills: 0 | Status: SHIPPED | OUTPATIENT
Start: 2024-07-27

## 2024-07-27 RX ORDER — HYDROCODONE BITARTRATE AND ACETAMINOPHEN 7.5; 325 MG/1; MG/1
1 TABLET ORAL EVERY 4 HOURS PRN
Qty: 45 TABLET | Refills: 0 | Status: SHIPPED | OUTPATIENT
Start: 2024-07-27 | End: 2024-08-01 | Stop reason: SDUPTHER

## 2024-07-27 RX ORDER — LANOLIN ALCOHOL/MO/W.PET/CERES
1000 CREAM (GRAM) TOPICAL DAILY
Qty: 30 TABLET | Refills: 0 | Status: SHIPPED | OUTPATIENT
Start: 2024-07-27

## 2024-07-27 RX ADMIN — HYDROCODONE BITARTRATE AND ACETAMINOPHEN 2 TABLET: 7.5; 325 TABLET ORAL at 07:23

## 2024-07-27 RX ADMIN — KETOROLAC TROMETHAMINE 15 MG: 15 INJECTION, SOLUTION INTRAMUSCULAR; INTRAVENOUS at 02:36

## 2024-07-27 RX ADMIN — HYDROCODONE BITARTRATE AND ACETAMINOPHEN 2 TABLET: 7.5; 325 TABLET ORAL at 12:15

## 2024-07-27 RX ADMIN — HYDROCODONE BITARTRATE AND ACETAMINOPHEN 2 TABLET: 7.5; 325 TABLET ORAL at 03:09

## 2024-07-27 RX ADMIN — APIXABAN 2.5 MG: 2.5 TABLET, FILM COATED ORAL at 08:36

## 2024-07-27 RX ADMIN — SODIUM CHLORIDE 125 MG: 9 INJECTION, SOLUTION INTRAVENOUS at 08:36

## 2024-07-27 RX ADMIN — Medication 10 ML: at 08:37

## 2024-07-27 RX ADMIN — KETOROLAC TROMETHAMINE 15 MG: 15 INJECTION, SOLUTION INTRAMUSCULAR; INTRAVENOUS at 08:38

## 2024-07-27 RX ADMIN — LEVOTHYROXINE SODIUM 112 MCG: 0.11 TABLET ORAL at 06:49

## 2024-07-27 NOTE — PROGRESS NOTES
[facility]   Hospitalist Progress Note       Patient Name: Lexus Tejada  : 1975  MRN: [id number]  Primary Care Physician: Torrie Pino APRN  Date of admission: 2024  Today's Date: 2024  Room / Bed:   [facility]/  Subjective   Chief Complaint: Medical management     HPI:     Lexus Tejada is a 48 y.o. female who is being seen by hospitalist for general medical management of chronic medical issues including migraines, anemia, HTN, hypothyroidism, OA knees.  She had left knee replacement 24 by Dr. Fontaine.  Currently resting in recliner.   at bedside.  Left knee pain reasonably controlled.  Has taken a few steps with her new left knee.  Denies any nausea or vomiting.  No chest pains palpitations or syncope.  Recent hemoglobin 8.6.    Interval Followup: 2024    Rested reasonably well last night  Vital signs stable  Hgb 7.5 (8.6 at home recently)  Iron profile consistent with iron deficiency  Morning creatinine 0.7  Left knee pain reasonably controlled  No nausea or vomiting  No lightheadedness.  Tolerating physical therapy.  No CP or SOA.      REVIEW OF SYSTEMS:   Left knee pain  Objective   Temp:  [97.9 °F (36.6 °C)-99.5 °F (37.5 °C)] 98.2 °F (36.8 °C)  Heart Rate:  [54-81] 81  Resp:  [16-18] 18  BP: (126-156)/(65-85) 130/65  PHYSICAL EXAM   CON: WN. WD. NAD.   NECK:  No thyromegaly. No stridor.   RESP:  CTA. No wheezes. No crackles.  No work of breathing or tachypnea.   CV:  Rhythm regular. Rate WNL. No murmur noted.  No edema.  GI:  Soft and nontender. Nondistended.    EXT: LLE intact neurovascularly  PSYCH:  Alert. Oriented. Normal affect and mood.  NEURO:  No dysarthria or aphasia. No unilateral weakness or paresthesia.  SKIN: No chronic venous stasis changes or varicosities.  No cellulitis  Results from last 7 days   Lab Units 24  0404   WBC 10*3/mm3 10.76   HEMOGLOBIN g/dL 7.5*   HEMATOCRIT % 26.1*   PLATELETS 10*3/mm3 337     Results from last 7 days    Lab Units 07/27/24  0404   SODIUM mmol/L 135*   POTASSIUM mmol/L 3.8   CO2 mmol/L 25.2   CHLORIDE mmol/L 102   ANION GAP mmol/L 7.8   BUN mg/dL 10   CREATININE mg/dL 0.71   GLUCOSE mg/dL 98         COMPLEXITY OF DATA / DECISION MAKING     []  Moderate: One acute illness or mild exacerbation of chronic or 2 stable chronic or tx side effects   []  High:  Severe acute illness or severe exacerbation of chronic - potential for major debility / life threatening         I have personally reviewed the results from the time of this admission to 7/27/2024 11:04 EDT:  []  Laboratory:  []  Microbiology: []  Radiology:  []  Telemetry:   []  Cardiology/Vascular:  []  Pathology:  []  Prior external records:  []  Independent historian provided additional details:      []  Discussed case with specialists:    []  Independent interpretation of ECG/Imaging etc:             []  Moderate: Rx management, low risk surgery, suboptimal social situation   []  High:  Rx with close monitoring for toxicity, mod-high risk surgery, DNR decision, Comfort initiated, IV pain meds    Assessment / Plan   Assessment:     Medical management  HTN  Migraines  Hypothyroidism  Anemia with history of fibroids and heavy menses.  Iron deficiency  Allergic to lisinopril/ACE inhibitors  OA left knee  Left knee replacement 7/26/24 by Dr. Fontaine     Plan:     IV iron infusion x1 this morning.  Patient has iron supplement at home and will resume.  Discussed her iron deficiency and history of menorrhagia.  She will need to follow-up soon with PCP.  Refer back to GYN in light of history of fibroids.  Additionally, orthopedist has her on Eliquis for DVT prophylaxis after knee surgery x 1 week and then aspirin for 3 more weeks.    Resume home meds   Follow-up with PT and orthopedist   Follow-up with PCP as above gentle IV fluid hydration.  Monitor blood pressure trend.  Check BMP in the morning.  Daily PT recommended  Pain med Rx per orthopedist  DVT prophylaxis per  orthopedist     VTE Prophylaxis:  Pharmacologic & mechanical VTE prophylaxis orders are present.      CODE STATUS:      Code Status (Patient has no pulse and is not breathing): CPR (Attempt to Resuscitate)  Medical Interventions (Patient has pulse or is breathing): Full Support       Electronically signed by DIOR Rasmussen, 07/27/24, 11:04 AM EDT.

## 2024-07-27 NOTE — THERAPY TREATMENT NOTE
Acute Care - Physical Therapy Treatment Note   [facility]     Patient Name: Lexus Tejada  : 1975  MRN: [id number]  Today's Date: 2024      Visit Dx:     ICD-10-CM ICD-9-CM   1. Primary osteoarthritis of left knee  M17.12 715.16   2. Pain in both knees, unspecified chronicity  M25.561 719.46    M25.562    3. Osteoarthritis of both knees, unspecified osteoarthritis type  M17.0 715.96   4. Difficulty in walking  R26.2 719.7   5. Primary osteoarthritis of both knees  M17.0 715.16   6. Impaired mobility and ADLs  Z74.09 V49.89    Z78.9      Patient Active Problem List   Diagnosis    Pain in both knees    Osteoarthritis of both knees    Primary osteoarthritis of left knee     Past Medical History:   Diagnosis Date    Arthritis     Back pain     Disease of thyroid gland     Hypertension     Iron deficiency anemia     Migraines      Past Surgical History:   Procedure Laterality Date    CHOLECYSTECTOMY      LAPAROSCOPIC TUBAL LIGATION       PT Assessment (Last 12 Hours)       PT Evaluation and Treatment       Row Name 24 1002          Physical Therapy Time and Intention    Subjective Information complains of;pain  -VK     Document Type therapy note (daily note)  -VK     Mode of Treatment individual therapy;group therapy;physical therapy  -VK     Patient Effort good  -VK     Comment Gait performed individually; therapuetic exercises performed in a group session with 5 participants  -VK       Row Name 24 1002          General Information    Patient Profile Reviewed yes  -VK     Patient Observations alert;cooperative;agree to therapy  -VK     Existing Precautions/Restrictions fall;weight bearing  -VK       Row Name 24 1002          Pain    Pain Location - Side/Orientation Left  -VK     Pain Location generalized  -VK     Pain Location - knee  -VK     Pain Intervention(s) Cold applied;Repositioned;Ambulation/increased activity;Rest  -VK       Row Name 24 1002          Cognition     Affect/Mental Status (Cognition) WNL  -VK     Orientation Status (Cognition) oriented x 3  -VK     Personal Safety Interventions fall prevention program maintained;gait belt;nonskid shoes/slippers when out of bed  -VK       Row Name 07/27/24 1002          General Lower Extremity Assessment (Range of Motion)    Comment: Lower Extremity ROM L knee flexion: 91 degrees, extension 5  -VK       Row Name 07/27/24 1002          Mobility    Extremity Weight-bearing Status left lower extremity  -VK     Left Lower Extremity (Weight-bearing Status) weight-bearing as tolerated (WBAT)  -VK       Row Name 07/27/24 1002          Transfers    Transfers sit-stand transfer;stand-sit transfer;car transfer  -VK       Row Name 07/27/24 1002          Sit-Stand Transfer    Sit-Stand [place] (Transfers) standby assist;contact guard;verbal cues  -VK     Assistive Device (Sit-Stand Transfers) walker, front-wheeled  -VK       Row Name 07/27/24 1002          Stand-Sit Transfer    Stand-Sit [place] (Transfers) standby assist;contact guard;verbal cues  -VK     Assistive Device (Stand-Sit Transfers) walker, front-wheeled  -VK       Row Name 07/27/24 1002          Car Transfer    Type (Car Transfer) sit-stand;stand-sit  -VK     [place] Level (Car Transfer) standby assist;verbal cues;set up  -VK     Assistive Device (Car Transfer) walker, front-wheeled  -VK       Row Name 07/27/24 1002          Gait/Stairs (Locomotion)    Gait/Stairs Locomotion gait/ambulation assistive device  -VK     [place] Level (Gait) standby assist;contact guard  -VK     Assistive Device (Gait) walker, front-wheeled  -VK     Patient was able to Ambulate yes  -VK     Distance in Feet (Gait) --  35ft, 48ft  -VK     Pattern (Gait) step-through  -VK     Deviations/Abnormal Patterns (Gait) antalgic;elissa decreased;gait speed decreased;stride length decreased  -VK     Bilateral Gait Deviations forward flexed posture  -VK     Left Sided Gait Deviations weight  shift ability decreased  -VK     [place] Level (Stairs) contact guard;verbal cues  -VK     Assistive Device (Stairs) walker, front-wheeled  -VK     Handrail Location (Stairs) both sides  -VK     Number of Steps (Stairs) 6  -VK     Ascending Technique (Stairs) step-to-step  -VK     Descending Technique (Stairs) step-to-step  -VK     Stairs, Safety Issues weight-shifting ability decreased  -VK       Row Name 07/27/24 1002          Safety Issues, Functional Mobility    Impairments Affecting Function (Mobility) balance;pain;range of motion (ROM);strength  -       Row Name 07/27/24 1002          Balance    Balance Assessment standing dynamic balance  -VK     Dynamic Standing Balance standby assist;contact guard  -VK     Position/Device Used, Standing Balance walker, front-wheeled  -VK       Row Name 07/27/24 1002          Motor Skills    Therapeutic Exercise hip;knee;ankle  -[facility] Name 07/27/24 1002          Hip (Therapeutic Exercise)    Hip (Therapeutic Exercise) isometric exercises  -     Hip Isometrics (Therapeutic Exercise) left;gluteal sets;10 repetitions;2 sets  -       Row Name 07/27/24 1002          Knee (Therapeutic Exercise)    Knee (Therapeutic Exercise) strengthening exercise;isometric exercises  -     Knee Isometrics (Therapeutic Exercise) left;quad sets;10 repetitions;2 sets  -     Knee Strengthening (Therapeutic Exercise) left;heel slides;SLR (straight leg raise);SAQ (short arc quad);LAQ (long arc quad);10 repetitions;2 sets  -       Row Name 07/27/24 1002          Ankle (Therapeutic Exercise)    Ankle (Therapeutic Exercise) AROM (active range of motion)  -     Ankle AROM (Therapeutic Exercise) left;dorsiflexion;plantarflexion;10 repetitions;2 sets  -       Row Name             Wound 07/26/24 0841 Left anterior knee Incision    Wound - Properties Group Placement Date: 07/26/24  -BW Placement Time: 0841  -BW Present on Original Admission: N  -BW Side: Left  -BW Orientation:  anterior  -BW Location: knee  -BW Primary Wound Type: Incision  -BW    Retired Wound - Properties Group Placement Date: 07/26/24  -BW Placement Time: 0841  -BW Present on Original Admission: N  -BW Side: Left  -BW Orientation: anterior  -BW Location: knee  -BW Primary Wound Type: Incision  -BW    Retired Wound - Properties Group Date first assessed: 07/26/24  -BW Time first assessed: 0841  -BW Present on Original Admission: N  -BW Side: Left  -BW Location: knee  -BW Primary Wound Type: Incision  -BW      Row Name             Wound 07/26/24 0845 Left proximal leg Incision    Wound - Properties Group Placement Date: 07/26/24  -BW Placement Time: 0845  -BW Present on Original Admission: N  -BW Side: Left  -BW Orientation: proximal  -BW Location: leg  -BW Primary Wound Type: Incision  -BW    Retired Wound - Properties Group Placement Date: 07/26/24  -BW Placement Time: 0845  -BW Present on Original Admission: N  -BW Side: Left  -BW Orientation: proximal  -BW Location: leg  -BW Primary Wound Type: Incision  -BW    Retired Wound - Properties Group Date first assessed: 07/26/24  -BW Time first assessed: 0845  -BW Present on Original Admission: N  -BW Side: Left  -BW Location: leg  -BW Primary Wound Type: Incision  -BW      Row Name 07/27/24 1002          Positioning and Restraints    Pre-Treatment Position sitting in chair/recliner  -VK     Post Treatment Position chair  -VK     In Chair reclined;call light within reach;encouraged to call for assist;exit alarm on;legs elevated  -VK       Row Name 07/27/24 1002          Progress Summary (PT)    Progress Toward Functional Goals (PT) progress toward functional goals is good  -VK     Daily Progress Summary (PT) Pt is progressing well with their exercise program.  Will continue current plan of care.  -VK               User Key  (r) = Recorded By, (t) = Taken By, (c) = Cosigned By      Initials Name Provider Type    Robert Miramontes, RN Registered Nurse    Altagracia Talavera,  PTA Physical Therapist Assistant                    Physical Therapy Education       Title: PT OT SLP Therapies (Resolved)       Topic: Physical Therapy (Resolved)       Point: Mobility training (Resolved)       Learning Progress Summary             Patient Acceptance, E,TB,D, VU,DU by  at 7/27/2024 1004    Acceptance, E,TB, VU by SERGIO at 7/26/2024 1039                         Point: Precautions (Resolved)       Learning Progress Summary             Patient Acceptance, E,TB,D, VU,DU by  at 7/27/2024 1004    Acceptance, E,TB, VU by SERGIO at 7/26/2024 1039                                         User Key       Initials Effective Dates Name Provider Type Discipline    AC 06/16/21 -  Kayleen Woodward, OT Occupational Therapist OT    SERGIO 06/03/21 -  Raphael Paniagua, PT Physical Therapist PT                  PT Recommendation and Plan     Progress Summary (PT)  Progress Toward Functional Goals (PT): progress toward functional goals is good  Daily Progress Summary (PT): Pt is progressing well with their exercise program.  Will continue current plan of care.   Outcome Measures       Row Name 07/27/24 1200 07/26/24 1400 07/26/24 1000       How much help from another person do you currently need...    Turning from your back to your side while in flat bed without using bedrails? 4  -VK 3  -SERGIO (r) TL (t) SERGIO (c) 3  -SERGIO    Moving from lying on back to sitting on the side of a flat bed without bedrails? 3  -VK 3  -SERGIO (r) TL (t) SERGIO (c) 3  -SERGIO    Moving to and from a bed to a chair (including a wheelchair)? 3  -VK 3  -SERGIO (r) TL (t) SERGIO (c) 3  -SERGIO    Standing up from a chair using your arms (e.g., wheelchair, bedside chair)? 3  -VK 3  -SERGIO (r) TL (t) SERGIO (c) 3  -SERGIO    Climbing 3-5 steps with a railing? 3  -VK 3  -SERGIO (r) TL (t) SERGIO (c) 3  -SERGIO    To walk in hospital room? 3  -VK 3  -SERGIO (r) TL (t) SERGIO (c) 3  -SERGIO    AM-PAC 6 Clicks Score (PT) 19  -VK 18  -SERGIO (r) TL (t) 18  -SERGIO    Highest Level of Mobility Goal 6 --> Walk 10 steps or more  -VK 6  --> Walk 10 steps or more  -SERGIO (r) TL (t) 6 --> Walk 10 steps or more  -SERGIO       Functional Assessment    Outcome Measure Options -- AM-PAC 6 Clicks Basic Mobility (PT)  -SERGIO (r) TL (t) SERGIO (c) AM-PAC 6 Clicks Basic Mobility (PT)  -SERGIO              User Key  (r) = Recorded By, (t) = Taken By, (c) = Cosigned By      Initials Name Provider Type    Raphael Jacobs, PT Physical Therapist    VK Altagracia Ramos PTA Physical Therapist Assistant    TL Jo Ann Caban, PT Student PT Student                     Time Calculation:    PT Charges       Row Name 07/27/24 1237             Time Calculation    PT Received On 07/27/24  -VK         Timed Charges    27514 - Gait Training Minutes  8  -VK      63273 - PT Therapeutic Activity Minutes 8  -VK         Untimed Charges    PT Group Therapy Minutes 60  -VK         Total Minutes    Timed Charges Total Minutes 16  -VK      Untimed Charges Total Minutes 60  -VK       Total Minutes 76  -VK                User Key  (r) = Recorded By, (t) = Taken By, (c) = Cosigned By      Initials Name Provider Type    Altagracia Talavera PTA Physical Therapist Assistant                  Therapy Charges for Today       Code Description Service Date Service Provider Modifiers Qty    40303138099 HC GAIT TRAINING EA 15 MIN 7/27/2024 Altagracia Ramos PTA GP 1    16900801311 HC PT THER PROC GROUP 7/27/2024 Altagracia Ramos PTA GP 1            PT G-Codes  Outcome Measure Options: AM-PAC 6 Clicks Daily Activity (OT), Optimal Instrument  AM-PAC 6 Clicks Score (PT): 19  AM-PAC 6 Clicks Score (OT): 21    Altagracia Ramos PTA  7/27/2024

## 2024-07-27 NOTE — PLAN OF CARE
Goal Outcome Evaluation:  Plan of Care Reviewed With: patient        Progress: no change  Outcome Evaluation: pt. is a one person assist with walker and has ambulated 40 feet this shift.  voiding without difficulty.  pt. has been medicated x 2 for pain with relief noted.  upon discharge pt. is going home and will be going to outpt therapy.

## 2024-07-27 NOTE — PLAN OF CARE
Goal Outcome Evaluation:              Outcome Evaluation: Pt was able to work with PT and OT before going home today. Pt walked 70 feet this morning. Pt will be doing home health with [facility]. Pt will be going home with her  to help her. Pt's pain has been controlled with PRN pain medication during my shift. Pt is alert and oriented x4.

## 2024-07-27 NOTE — PLAN OF CARE
Goal Outcome Evaluation:  Plan of Care Reviewed With: patient        Progress: no change  Outcome Evaluation: Patient presents with balance and endurance limitations that impede his/her ability to perform ADLS. The skills of a therapist are necessary to maximize independence with ADLs.      Anticipated Discharge Disposition (OT): home with assist, home with outpatient therapy services

## 2024-07-27 NOTE — THERAPY EVALUATION
Patient Name: Lexus Tejada  : 1975    MRN: [id number]                              Today's Date: 2024       Admit Date: 2024    Visit Dx:     ICD-10-CM ICD-9-CM   1. Primary osteoarthritis of left knee  M17.12 715.16   2. Pain in both knees, unspecified chronicity  M25.561 719.46    M25.562    3. Osteoarthritis of both knees, unspecified osteoarthritis type  M17.0 715.96   4. Difficulty in walking  R26.2 719.7   5. Primary osteoarthritis of both knees  M17.0 715.16   6. Impaired mobility and ADLs  Z74.09 V49.89    Z78.9      Patient Active Problem List   Diagnosis    Pain in both knees    Osteoarthritis of both knees    Primary osteoarthritis of left knee     Past Medical History:   Diagnosis Date    Arthritis     Back pain     Disease of thyroid gland     Hypertension     Iron deficiency anemia     Migraines      Past Surgical History:   Procedure Laterality Date    CHOLECYSTECTOMY      LAPAROSCOPIC TUBAL LIGATION        General Information       Row Name 24 1005 24 0951       OT Time and Intention    Document Type therapy note (daily note)  -AC evaluation  -AC    Mode of Treatment individual therapy;occupational therapy  -AC individual therapy;occupational therapy  -AC      Row Name 24 1005 24 0951       General Information    Patient Profile Reviewed yes  -AC yes  -AC    Prior Level of Function -- independent:  -AC    Existing Precautions/Restrictions fall;weight bearing  -AC fall;weight bearing  -AC    Barriers to Rehab none identified  -AC none identified  -AC      Row Name 24 0951          Occupational Profile    Reason for Services/Referral (Occupational Profile) Pt. is a 48year old female admitted for the above diagnosis status post left total knee replacement on 2024. Pt. referred to OT services to assess independence with ADLs and adl transfers/fx'l mobility. No previous OT services for current condition.  -AC       Row Name 24 0951           Living Environment    People in Home spouse  -AC       Row Name 07/27/24 0951          Home Main Entrance    Number of Stairs, Main Entrance four  -AC     Stair Railings, Main Entrance railings safe and in good condition  -AC       Row Name 07/27/24 1005 07/27/24 0951       Cognition    Orientation Status (Cognition) oriented x 3  -AC oriented x 3  -AC      Row Name 07/27/24 1005 07/27/24 0951       Safety Issues, Functional Mobility    Impairments Affecting Function (Mobility) balance;pain;endurance/activity tolerance  -AC balance;pain;endurance/activity tolerance  -AC              User Key  (r) = Recorded By, (t) = Taken By, (c) = Cosigned By      Initials Name Provider Type    AC Kayleen Woodward OT Occupational Therapist                     Mobility/ADL's       Row Name 07/27/24 1005 07/27/24 0958       Bed Mobility    Comment, (Bed Mobility) patient in recliner upon OT entering the room.  -AC patient in recliner upon OT entering the room.  -AC      Row Name 07/27/24 1005 07/27/24 0958       Transfers    Transfers sit-stand transfer  -AC sit-stand transfer  -AC      Row Name 07/27/24 1005 07/27/24 0958       Sit-Stand Transfer    Sit-Stand [place] (Transfers) contact guard;verbal cues  -AC contact guard  -    Assistive Device (Sit-Stand Transfers) walker, front-wheeled  -AC walker, front-wheeled  -AC    Comment, (Sit-Stand Transfer) x3  -AC --      Row Name 07/27/24 1005 07/27/24 0958       Functional Mobility    Functional Mobility- Ind. Level contact guard assist;1 person;verbal cues required  -AC contact guard assist;1 person  -AC    Functional Mobility- Device walker, front-wheeled  -AC walker, front-wheeled  -AC    Functional Mobility- Comment Patient was CGA with rolling walker for recliner to/from sink with cues for safety and tehcnique.  -AC --      Row Name 07/27/24 1005 07/27/24 0958       Activities of Daily Living    BADL Assessment/Intervention upper body dressing;lower body dressing  -AC --   patient is setup for upper body bathing/dressing, setup for grooming, setup for self-feeding, CGA/min A for lower body bathing/dressing, CGA for toileting  -      Row Name 07/27/24 1005 07/27/24 0958       Mobility    Extremity Weight-bearing Status left lower extremity  - left lower extremity  -AC    Left Lower Extremity (Weight-bearing Status) weight-bearing as tolerated (WBAT)  - weight-bearing as tolerated (WBAT)  -[facility] Name 07/27/24 1005          Upper Body Dressing Assessment/Training    [place] Level (Upper Body Dressing) upper body dressing skills;doff;bra/undergarment;don;pull-over garment;set up  -AC     Position (Upper Body Dressing) unsupported sitting  -       Row Name 07/27/24 1005          Lower Body Dressing Assessment/Training    [place] Level (Lower Body Dressing) lower body dressing skills;doff;don;pants/bottoms;shoes/slippers;socks;contact guard assist;minimum assist (75% patient effort);verbal cues  -AC     Position (Lower Body Dressing) supported standing;unsupported sitting  -               User Key  (r) = Recorded By, (t) = Taken By, (c) = Cosigned By      Initials Name Provider Type     Kayleen Woodward OT Occupational Therapist                   Obj/Interventions       Row Name 07/27/24 1007          Sensory Assessment (Somatosensory)    Sensory Assessment (Somatosensory) UE sensation intact  -AC       Row Name 07/27/24 1007          Vision Assessment/Intervention    Visual Impairment/Limitations WFL  -[facility] 07/27/24 1007          Range of Motion Comprehensive    General Range of Motion bilateral upper extremity ROM WNL  -AC       Row Name 07/27/24 1007          Strength Comprehensive (MMT)    General Manual Muscle Testing (MMT) Assessment no strength deficits identified  -AC       Row Name 07/27/24 1007          Motor Skills    Motor Skills coordination;functional endurance  -     Coordination WFL  -     Functional Endurance fair + for adls  -        Row Name 07/27/24 1007          Balance    Balance Assessment standing dynamic balance  -AC     Dynamic Standing Balance contact guard  -     Position/Device Used, Standing Balance supported;walker, front-wheeled  -AC     Balance Interventions standing;sit to stand;supported;dynamic;minimal challenge;occupation based/functional task  -AC               User Key  (r) = Recorded By, (t) = Taken By, (c) = Cosigned By      Initials Name Provider Type    AC Kayleen Woodward OT Occupational Therapist                   Goals/Plan       Row Name 07/27/24 1002          Bed Mobility Goal 1 (OT)    Activity/Assistive Device (Bed Mobility Goal 1, OT) bed mobility activities, all  -AC     [place] Level/Cues Needed (Bed Mobility Goal 1, OT) modified independence  -AC     Time Frame (Bed Mobility Goal 1, OT) long term goal (LTG);10 days  -[facility] Name 07/27/24 1002          Transfer Goal 1 (OT)    Activity/Assistive Device (Transfer Goal 1, OT) transfers, all  -AC     [place] Level/Cues Needed (Transfer Goal 1, OT) modified independence  -AC     Time Frame (Transfer Goal 1, OT) long term goal (LTG);10 days  -[facility] Name 07/27/24 1002          Bathing Goal 1 (OT)    Activity/Device (Bathing Goal 1, OT) bathing skills, all  -AC     [place] Level/Cues Needed (Bathing Goal 1, OT) modified independence  -AC     Time Frame (Bathing Goal 1, OT) long term goal (LTG);10 days  -[facility] Name 07/27/24 1002          Dressing Goal 1 (OT)    Activity/Device (Dressing Goal 1, OT) dressing skills, all  -AC     [place]/Cues Needed (Dressing Goal 1, OT) modified independence  -AC     Time Frame (Dressing Goal 1, OT) long term goal (LTG);10 days  -       Row Name 07/27/24 1002          Toileting Goal 1 (OT)    Activity/Device (Toileting Goal 1, OT) toileting skills, all  -AC     [place] Level/Cues Needed (Toileting Goal 1, OT) modified independence  -AC     Time Frame (Toileting Goal 1, OT) long term  goal (LTG);10 days  -       Row Name 07/27/24 1002          Problem Specific Goal 1 (OT)    Problem Specific Goal 1 (OT) patient will demonstrate good activity tolerance for adls.  -     Time Frame (Problem Specific Goal 1, OT) long term goal (LTG);10 days  -       Row Name 07/27/24 1002          Therapy Assessment/Plan (OT)    Planned Therapy Interventions (OT) activity tolerance training;functional balance retraining;occupation/activity based interventions;ROM/therapeutic exercise;transfer/mobility retraining;patient/caregiver education/training;BADL retraining  -               User Key  (r) = Recorded By, (t) = Taken By, (c) = Cosigned By      Initials Name Provider Type     Kayleen Woodward OT Occupational Therapist                   Clinical Impression       Row Name 07/27/24 1008 07/27/24 1000       Pain Assessment    Pretreatment Pain Rating 0/10 - no pain  -AC 0/10 - no pain  -AC    Posttreatment Pain Rating 0/10 - no pain  -AC 0/10 - no pain  -      Row Name 07/27/24 1008 07/27/24 1000       Plan of Care Review    Plan of Care Reviewed With patient  -AC patient  -AC    Progress improving  - no change  -    Outcome Evaluation patient instructed in lower body adaptive dressing technique, weightbearing status, joint protection and safety with adl transfers. Patient to continue with therapy in order to maximize indepedence with adls.  - Patient presents with balance and endurance limitations that impede his/her ability to perform ADLS. The skills of a therapist are necessary to maximize independence with ADLs.  -      Row Name 07/27/24 1000          Therapy Assessment/Plan (OT)    Patient/Family Therapy Goal Statement (OT) Patient wants to walk with less pain.  -AC     Rehab Potential (OT) good, to achieve stated therapy goals  -     Criteria for Skilled Therapeutic Interventions Met (OT) yes;meets criteria;skilled treatment is necessary  -     Therapy Frequency (OT) 5 times/wk  -AC        Row Name 07/27/24 1000          Therapy Plan Review/Discharge Plan (OT)    Equipment Needs Upon Discharge (OT) walker, rolling;commode chair  -AC     Anticipated Discharge Disposition (OT) home with assist;home with outpatient therapy services  -[facility] Name 07/27/24 1000          Positioning and Restraints    Pre-Treatment Position sitting in chair/recliner  -AC     Post Treatment Position chair  -AC     In Chair call light within reach;encouraged to call for assist;exit alarm on;reclined;legs elevated  -AC               User Key  (r) = Recorded By, (t) = Taken By, (c) = Cosigned By      Initials Name Provider Type    Kayleen Hubbard OT Occupational Therapist                   Outcome Measures       Row Name 07/27/24 1003          How much help from another is currently needed...    Putting on and taking off regular lower body clothing? 3  -AC     Bathing (including washing, rinsing, and drying) 3  -AC     Toileting (which includes using toilet bed pan or urinal) 3  -AC     Putting on and taking off regular upper body clothing 4  -AC     Taking care of personal grooming (such as brushing teeth) 4  -AC     Eating meals 4  -AC     AM-PAC 6 Clicks Score (OT) 21  -AC       [facility] Name 07/27/24 1003          Functional Assessment    Outcome Measure Options AM-PAC 6 Clicks Daily Activity (OT);Optimal Instrument  -AC       Row Name 07/27/24 1003          Optimal Instrument    Optimal Instrument Optimal - 3  -AC     Bending/Stooping 2  -AC     Standing 2  -AC     Reaching 1  -AC     From the list, choose the 3 activities you would most like to be able to do without any difficulty Bending/stooping;Standing;Reaching  -AC     Total Score Optimal - 3 5  -AC               User Key  (r) = Recorded By, (t) = Taken By, (c) = Cosigned By      Initials Name Provider Type    Kayleen Hubbard OT Occupational Therapist                    Occupational Therapy Education       Title: PT OT SLP Therapies (Done)       Topic:  Occupational Therapy (Done)       Point: ADL training (Done)       Description:   Instruct learner(s) on proper safety adaptation and remediation techniques during self care or transfers.   Instruct in proper use of assistive devices.                  Learning Progress Summary             Patient Acceptance, E,TB,D, VU,DU by  at 7/27/2024 1004                         Point: Home exercise program (Done)       Description:   Instruct learner(s) on appropriate technique for monitoring, assisting and/or progressing therapeutic exercises/activities.                  Learning Progress Summary             Patient Acceptance, E,TB,D, VU,DU by  at 7/27/2024 1004                         Point: Precautions (Done)       Description:   Instruct learner(s) on prescribed precautions during self-care and functional transfers.                  Learning Progress Summary             Patient Acceptance, E,TB,D, VU,DU by  at 7/27/2024 1004                         Point: Body mechanics (Done)       Description:   Instruct learner(s) on proper positioning and spine alignment during self-care, functional mobility activities and/or exercises.                  Learning Progress Summary             Patient Acceptance, E,TB,D, VU,DU by  at 7/27/2024 1004                                         User Key       Initials Effective Dates Name Provider Type Discipline     06/16/21 -  Kayleen Woodward OT Occupational Therapist OT                  OT Recommendation and Plan  Planned Therapy Interventions (OT): activity tolerance training, functional balance retraining, occupation/activity based interventions, ROM/therapeutic exercise, transfer/mobility retraining, patient/caregiver education/training, BADL retraining  Therapy Frequency (OT): 5 times/wk  Plan of Care Review  Plan of Care Reviewed With: patient  Progress: improving  Outcome Evaluation: patient instructed in lower body adaptive dressing technique, weightbearing status, joint  protection and safety with adl transfers. Patient to continue with therapy in order to maximize indepedence with adls.     Time Calculation:   Evaluation Complexity (OT)  Review Occupational Profile/Medical/Therapy History Complexity: expanded/moderate complexity  Assessment, Occupational Performance/Identification of Deficit Complexity: 1-3 performance deficits  Clinical Decision Making Complexity (OT): problem focused assessment/low complexity  Overall Complexity of Evaluation (OT): low complexity     Time Calculation- OT       Row Name 07/27/24 1004             Time Calculation- OT    OT Received On 07/27/24  -AC      OT Goal Re-Cert Due Date 08/05/24  -AC         Timed Charges    47318 - OT Self Care/Mgmt Minutes 10  -AC         Untimed Charges    OT Eval/Re-eval Minutes 31  -AC         Total Minutes    Timed Charges Total Minutes 10  -AC      Untimed Charges Total Minutes 31  -AC       Total Minutes 41  -AC                User Key  (r) = Recorded By, (t) = Taken By, (c) = Cosigned By      Initials Name Provider Type    AC Kayleen Woodward OT Occupational Therapist                  Therapy Charges for Today       Code Description Service Date Service Provider Modifiers Qty    [id number]  OT SELF CARE/MGMT/TRAIN EA 15 MIN 7/27/2024 Kayleen Woodward OT GO 1    [id number]  OT EVAL LOW COMPLEXITY 3 7/27/2024 Kayleen Woodward OT GO 1                 Kayleen Woodward OT  7/27/2024

## 2024-08-01 ENCOUNTER — TELEPHONE (OUTPATIENT)
Dept: ORTHOPEDIC SURGERY | Facility: CLINIC | Age: 49
End: 2024-08-01
Payer: COMMERCIAL

## 2024-08-01 DIAGNOSIS — M17.12 PRIMARY OSTEOARTHRITIS OF LEFT KNEE: ICD-10-CM

## 2024-08-01 RX ORDER — HYDROCODONE BITARTRATE AND ACETAMINOPHEN 7.5; 325 MG/1; MG/1
1 TABLET ORAL EVERY 4 HOURS PRN
Qty: 42 TABLET | Refills: 0 | Status: SHIPPED | OUTPATIENT
Start: 2024-08-01

## 2024-08-07 DIAGNOSIS — M17.12 PRIMARY OSTEOARTHRITIS OF LEFT KNEE: ICD-10-CM

## 2024-08-07 RX ORDER — HYDROCODONE BITARTRATE AND ACETAMINOPHEN 7.5; 325 MG/1; MG/1
1 TABLET ORAL EVERY 4 HOURS PRN
Qty: 42 TABLET | Refills: 0 | Status: SHIPPED | OUTPATIENT
Start: 2024-08-07

## 2024-08-09 ENCOUNTER — OFFICE VISIT (OUTPATIENT)
Dept: ORTHOPEDIC SURGERY | Facility: CLINIC | Age: 49
End: 2024-08-09
Payer: COMMERCIAL

## 2024-08-09 VITALS
OXYGEN SATURATION: 98 % | SYSTOLIC BLOOD PRESSURE: 159 MMHG | DIASTOLIC BLOOD PRESSURE: 97 MMHG | BODY MASS INDEX: 36.06 KG/M2 | HEIGHT: 61 IN | WEIGHT: 191 LBS | HEART RATE: 70 BPM

## 2024-08-09 DIAGNOSIS — Z96.652 S/P TOTAL KNEE ARTHROPLASTY, LEFT: ICD-10-CM

## 2024-08-09 DIAGNOSIS — Z96.652 AFTERCARE FOLLOWING LEFT KNEE JOINT REPLACEMENT SURGERY: ICD-10-CM

## 2024-08-09 DIAGNOSIS — M25.562 LEFT KNEE PAIN, UNSPECIFIED CHRONICITY: Primary | ICD-10-CM

## 2024-08-09 DIAGNOSIS — Z47.1 AFTERCARE FOLLOWING LEFT KNEE JOINT REPLACEMENT SURGERY: ICD-10-CM

## 2024-08-09 PROCEDURE — 99024 POSTOP FOLLOW-UP VISIT: CPT

## 2024-08-09 NOTE — PROGRESS NOTES
"Chief Complaint  Follow-up of the Left Knee and Suture / Staple Removal    Subjective      Lexus Tejada presents to [facility] for follow up of her left knee.  Patient is 2 weeks status post left total knee arthroplasty with [employer] performed by Dr. Fontaine on 7/26/2024. She arrives today with a walker. She states she has been doing good. Has started PT and doing her home exercises.     Allergies   Allergen Reactions    Lisinopril Anaphylaxis    Penicillin G Other (See Comments)     Patient states when she was a kid    Alpha-Gal Nausea Only       Objective     Vital Signs:   Vitals:    08/09/24 1513   BP: 159/97   Pulse: 70   SpO2: 98%   Weight: 86.6 kg (191 lb)   Height: 154.9 cm (60.98\")     Body mass index is 36.11 kg/m².    I reviewed the patient's chief complaint, history of present illness, review of systems, past medical history, surgical history, family history, social history, medications, and allergy list.     REVIEW OF SYSTEMS    Constitutional: Denies fevers, chills, weight loss  Cardiovascular: Denies chest pain, shortness of breath  Skin: Denies rashes, acute skin changes  Neurologic: Denies headache, loss of consciousness  MSK: Left knee pain.     Ortho Exam  Knee   General: Alert, no acute distress.   Left knee: No pain with passive hip ROM.  Staples removed today in office without complication. Steri-strips applied, incision is clean, dry and intact, without swelling, redness or drainage.  Knee stable to varus/valgus stress.  Knee extensor mechanism intact. -5 degrees knee extension. Flexion to 105 degrees. Calf soft, non-tender.  Sensation and neurovascularly intact.  Demonstrates active ankle dorsiflexion and plantarflexion.  Palpable pedal pulses.               Imaging Results (Most Recent)       Procedure Component Value Units Date/Time    XR Knee 3 View Left [[id number]] Resulted: 08/12/24 1428     Updated: 08/12/24 1428    Narrative:      X-Ray " Report:  Study: X-rays ordered, taken in the office, and reviewed today.   Site: Left knee Xray  Indication: Left total knee arthroplasty follow up.   View: AP/Lateral, Standing, and [place] view(s)  Findings: Intact left total knee arthroplasty. No evidence of hardware   malfunction, complication or loosening. No periprosthetic fractures   visualized. Patella is midline tracking on sunrise view.   Prior studies available for comparison: yes                    Assessment and Plan   Diagnoses and all orders for this visit:    1. Left knee pain, unspecified chronicity (Primary)  -     XR Knee 3 View Left    2. Aftercare following left knee joint replacement surgery    3. S/P total knee arthroplasty, left         Lexus Tejada presents today 2 weeks post op left total knee arthroplasty performed by Dr. Fontaine on 7/26/24. X-rays were reviewed with the patient today. Sutures/staples removed today without complications. Incisions are well healing without active drainage or concerns for infection. Incision site care was reviewed today. Patient instructed not to soak or submerge incisions. Do not apply any lotions, creams, or ointments to the incisions at this time.      Patient instructed to continue with formal physical therapy and the use of cane/walker until recommended by PT to discontinue. We discussed the importance of home exercises in addition to PT. We discussed swelling management with continued icing of the knee for approximately 15-20 minutes, three times daily, and as needed.     We discussed the importance of weaning from narcotic medications. Patient expressed understanding.  Patient will continue ASA for DVT prophylaxis until prescription completed.      Patient will follow-up in 4 weeks for reevaluation.  We do not need xrays at this visit.     Call or return if symptoms worsen or patient has any concerns.            Tobacco Use: Low Risk  (8/9/2024)    Patient History     Smoking Tobacco Use: Never      Smokeless Tobacco Use: Never     Passive Exposure: Not on file     Patient reports that they are a nonsmoker; cessation education not applicable.            Follow Up   No follow-ups on file.  There are no Patient Instructions on file for this visit.  Patient was given instructions and counseling regarding her condition or for health maintenance advice. Please see specific information pulled into the AVS if appropriate.       Dictated Utilizing Dragon Dictation. Please note that portions of this note were completed with a voice recognition program. Part of this note may be an electronic transcription/translation of spoken language to printed text using the Dragon Dictation System.

## 2024-08-13 DIAGNOSIS — M17.12 PRIMARY OSTEOARTHRITIS OF LEFT KNEE: ICD-10-CM

## 2024-08-13 RX ORDER — HYDROCODONE BITARTRATE AND ACETAMINOPHEN 7.5; 325 MG/1; MG/1
1 TABLET ORAL EVERY 4 HOURS PRN
Qty: 42 TABLET | Refills: 0 | Status: SHIPPED | OUTPATIENT
Start: 2024-08-13 | End: 2024-08-19 | Stop reason: SDUPTHER

## 2024-08-19 DIAGNOSIS — M17.12 PRIMARY OSTEOARTHRITIS OF LEFT KNEE: ICD-10-CM

## 2024-08-19 RX ORDER — HYDROCODONE BITARTRATE AND ACETAMINOPHEN 7.5; 325 MG/1; MG/1
1 TABLET ORAL EVERY 4 HOURS PRN
Qty: 42 TABLET | Refills: 0 | Status: SHIPPED | OUTPATIENT
Start: 2024-08-19 | End: 2024-08-25 | Stop reason: SDUPTHER

## 2024-08-25 DIAGNOSIS — M17.12 PRIMARY OSTEOARTHRITIS OF LEFT KNEE: ICD-10-CM

## 2024-08-26 RX ORDER — HYDROCODONE BITARTRATE AND ACETAMINOPHEN 7.5; 325 MG/1; MG/1
1 TABLET ORAL EVERY 4 HOURS PRN
Qty: 42 TABLET | Refills: 0 | Status: SHIPPED | OUTPATIENT
Start: 2024-08-26 | End: 2024-08-29 | Stop reason: SDUPTHER

## 2024-08-29 DIAGNOSIS — M17.12 PRIMARY OSTEOARTHRITIS OF LEFT KNEE: ICD-10-CM

## 2024-08-30 RX ORDER — HYDROCODONE BITARTRATE AND ACETAMINOPHEN 7.5; 325 MG/1; MG/1
1 TABLET ORAL EVERY 4 HOURS PRN
Qty: 42 TABLET | Refills: 0 | Status: SHIPPED | OUTPATIENT
Start: 2024-08-30

## 2024-09-08 DIAGNOSIS — M17.12 PRIMARY OSTEOARTHRITIS OF LEFT KNEE: ICD-10-CM

## 2024-09-09 RX ORDER — HYDROCODONE BITARTRATE AND ACETAMINOPHEN 7.5; 325 MG/1; MG/1
1 TABLET ORAL EVERY 4 HOURS PRN
Qty: 42 TABLET | Refills: 0 | Status: SHIPPED | OUTPATIENT
Start: 2024-09-09 | End: 2024-09-13 | Stop reason: SDUPTHER

## 2024-09-13 DIAGNOSIS — M17.12 PRIMARY OSTEOARTHRITIS OF LEFT KNEE: ICD-10-CM

## 2024-09-16 RX ORDER — HYDROCODONE BITARTRATE AND ACETAMINOPHEN 7.5; 325 MG/1; MG/1
1 TABLET ORAL EVERY 4 HOURS PRN
Qty: 42 TABLET | Refills: 0 | Status: SHIPPED | OUTPATIENT
Start: 2024-09-16 | End: 2024-09-22 | Stop reason: SDUPTHER

## 2024-09-18 ENCOUNTER — OFFICE VISIT (OUTPATIENT)
Dept: ORTHOPEDIC SURGERY | Facility: CLINIC | Age: 49
End: 2024-09-18
Payer: COMMERCIAL

## 2024-09-18 VITALS
HEIGHT: 61 IN | WEIGHT: 191 LBS | HEART RATE: 71 BPM | SYSTOLIC BLOOD PRESSURE: 153 MMHG | OXYGEN SATURATION: 98 % | BODY MASS INDEX: 36.06 KG/M2 | DIASTOLIC BLOOD PRESSURE: 85 MMHG

## 2024-09-18 DIAGNOSIS — Z96.652 S/P TOTAL KNEE ARTHROPLASTY, LEFT: ICD-10-CM

## 2024-09-18 DIAGNOSIS — Z47.1 AFTERCARE FOLLOWING LEFT KNEE JOINT REPLACEMENT SURGERY: ICD-10-CM

## 2024-09-18 DIAGNOSIS — Z96.652 AFTERCARE FOLLOWING LEFT KNEE JOINT REPLACEMENT SURGERY: ICD-10-CM

## 2024-09-18 DIAGNOSIS — M25.562 LEFT KNEE PAIN, UNSPECIFIED CHRONICITY: Primary | ICD-10-CM

## 2024-09-18 PROCEDURE — 99024 POSTOP FOLLOW-UP VISIT: CPT

## 2024-09-22 DIAGNOSIS — M17.12 PRIMARY OSTEOARTHRITIS OF LEFT KNEE: ICD-10-CM

## 2024-09-23 DIAGNOSIS — M17.12 PRIMARY OSTEOARTHRITIS OF LEFT KNEE: Primary | ICD-10-CM

## 2024-09-23 RX ORDER — HYDROCODONE BITARTRATE AND ACETAMINOPHEN 7.5; 325 MG/1; MG/1
1 TABLET ORAL EVERY 4 HOURS PRN
Qty: 42 TABLET | Refills: 0 | Status: SHIPPED | OUTPATIENT
Start: 2024-09-23 | End: 2024-09-29 | Stop reason: SDUPTHER

## 2024-09-29 DIAGNOSIS — M17.12 PRIMARY OSTEOARTHRITIS OF LEFT KNEE: ICD-10-CM

## 2024-09-30 RX ORDER — HYDROCODONE BITARTRATE AND ACETAMINOPHEN 7.5; 325 MG/1; MG/1
1 TABLET ORAL EVERY 6 HOURS PRN
Qty: 28 TABLET | Refills: 0 | Status: SHIPPED | OUTPATIENT
Start: 2024-09-30 | End: 2024-10-07 | Stop reason: ALTCHOICE

## 2024-10-07 ENCOUNTER — TELEPHONE (OUTPATIENT)
Dept: ORTHOPEDIC SURGERY | Facility: CLINIC | Age: 49
End: 2024-10-07
Payer: COMMERCIAL

## 2024-10-07 DIAGNOSIS — Z96.652 S/P TOTAL KNEE ARTHROPLASTY, LEFT: Primary | ICD-10-CM

## 2024-10-07 RX ORDER — HYDROCODONE BITARTRATE AND ACETAMINOPHEN 5; 325 MG/1; MG/1
1 TABLET ORAL EVERY 8 HOURS PRN
Qty: 21 TABLET | Refills: 0 | Status: SHIPPED | OUTPATIENT
Start: 2024-10-07 | End: 2024-10-14 | Stop reason: SDUPTHER

## 2024-10-14 DIAGNOSIS — Z96.652 S/P TOTAL KNEE ARTHROPLASTY, LEFT: ICD-10-CM

## 2024-10-14 RX ORDER — HYDROCODONE BITARTRATE AND ACETAMINOPHEN 5; 325 MG/1; MG/1
1 TABLET ORAL EVERY 8 HOURS PRN
Qty: 21 TABLET | Refills: 0 | Status: SHIPPED | OUTPATIENT
Start: 2024-10-14

## 2024-10-21 DIAGNOSIS — Z96.652 S/P TOTAL KNEE ARTHROPLASTY, LEFT: ICD-10-CM

## 2024-10-21 RX ORDER — HYDROCODONE BITARTRATE AND ACETAMINOPHEN 5; 325 MG/1; MG/1
1 TABLET ORAL EVERY 8 HOURS PRN
Qty: 21 TABLET | Refills: 0 | OUTPATIENT
Start: 2024-10-21

## 2024-10-30 ENCOUNTER — OFFICE VISIT (OUTPATIENT)
Dept: ORTHOPEDIC SURGERY | Facility: CLINIC | Age: 49
End: 2024-10-30
Payer: COMMERCIAL

## 2024-10-30 VITALS
BODY MASS INDEX: 36.13 KG/M2 | WEIGHT: 191.36 LBS | OXYGEN SATURATION: 98 % | DIASTOLIC BLOOD PRESSURE: 76 MMHG | HEART RATE: 79 BPM | SYSTOLIC BLOOD PRESSURE: 118 MMHG | HEIGHT: 61 IN

## 2024-10-30 DIAGNOSIS — Z96.652 AFTERCARE FOLLOWING LEFT KNEE JOINT REPLACEMENT SURGERY: ICD-10-CM

## 2024-10-30 DIAGNOSIS — Z96.652 S/P TOTAL KNEE ARTHROPLASTY, LEFT: Primary | ICD-10-CM

## 2024-10-30 DIAGNOSIS — M25.562 LEFT KNEE PAIN, UNSPECIFIED CHRONICITY: ICD-10-CM

## 2024-10-30 DIAGNOSIS — Z47.1 AFTERCARE FOLLOWING LEFT KNEE JOINT REPLACEMENT SURGERY: ICD-10-CM

## 2024-10-30 NOTE — PROGRESS NOTES
"Chief Complaint  Pain and Follow-up of the Left Knee    Subjective      Lexus Tejada presents to [facility] for follow up of her left knee.  Patient is 12 weeks status post left total knee arthroplasty performed Dr. Fontaine on 7/26/2024.  Patient attending physical therapy at [address] and doing well.  She states that she has finished with formal physical therapy and been doing her home exercises and doing well.  She states that she is ready to return to work at this time.    Allergies   Allergen Reactions    Lisinopril Anaphylaxis    Penicillin G Other (See Comments)     Patient states when she was a kid    Alpha-Gal Nausea Only    Penicillins Rash       Objective     Vital Signs:   Vitals:    10/30/24 1439   BP: 118/76   Pulse: 79   SpO2: 98%   Weight: 86.8 kg (191 lb 5.8 oz)   Height: 154.9 cm (61\")     Body mass index is 36.16 kg/m².    I reviewed the patient's chief complaint, history of present illness, review of systems, past medical history, surgical history, family history, social history, medications, and allergy list.     REVIEW OF SYSTEMS    Constitutional: Denies fevers, chills, weight loss  Cardiovascular: Denies chest pain, shortness of breath  Skin: Denies rashes, acute skin changes  Neurologic: Denies headache, loss of consciousness  MSK: Left knee pain.     Ortho Exam  Knee   General: Alert, no acute distress.   Left knee: Well-healed incision.  Knee stable to varus/valgus stress.  Knee extensor mechanism intact.  0 degrees knee extension. Flexion to 120 degrees. Calf soft, non-tender.  Sensation and neurovascularly intact.  Demonstrates active ankle dorsiflexion and plantarflexion.  Palpable pedal pulses.          Imaging Results (Most Recent)       Procedure Component Value Units Date/Time    XR Knee 3 View Left [[id number]] Resulted: 10/31/24 0852     Updated: 10/31/24 0852    Narrative:      X-Ray Report:  Study: X-rays ordered, taken in the office, and " reviewed today.   Site: Left knee Xray  Indication: Left total knee arthroplasty follow up.   View: AP/Lateral, Standing, and [place] view(s)  Findings: Intact left total knee arthroplasty. No evidence of hardware   malfunction, complication or loosening. No periprosthetic fractures   visualized. Patella is midline tracking on sunrise view.   Prior studies available for comparison: yes                    Assessment and Plan   Diagnoses and all orders for this visit:    1. S/P total knee arthroplasty, left (Primary)  -     XR Knee 3 View Left    2. Left knee pain, unspecified chronicity    3. Aftercare following left knee joint replacement surgery         Lexus Tejada presents today 12 weeks post op left total knee arthroplasty performed by Dr. Fontaine on 1/26/2024. X-rays were reviewed with the patient today. Hardware is stable and intact. Patient is doing well.     Incision is well healed. Encouraged to continue home exercises for ROM and strengthening. May continue icing as needed for no more than 20 minutes at a time for comfort and inflammation. Discussed avoiding kneeling directly on to the prosthetic and avoid deep squatting.     Discussed the need for antibiotic prophylaxis with any dental procedures following total joint replacement for life. Patient instructed to contact office if dental office is reluctant to prescribe antibiotics prior to procedure and we will prescribe them.      Patient will follow-up in 9 months. We will obtain new x-rays of the left knee at next visit.    Call or return if symptoms worsen or patient has any concerns.          Tobacco Use: Low Risk  (10/30/2024)    Patient History     Smoking Tobacco Use: Never     Smokeless Tobacco Use: Never     Passive Exposure: Not on file     Patient reports that they are a nonsmoker; cessation education not applicable.       Follow Up   Return in about 9 months (around 7/30/2025).  There are no Patient Instructions on file for this  visit.  Patient was given instructions and counseling regarding her condition or for health maintenance advice. Please see specific information pulled into the AVS if appropriate.       Dictated Utilizing Dragon Dictation. Please note that portions of this note were completed with a voice recognition program. Part of this note may be an electronic transcription/translation of spoken language to printed text using the Dragon Dictation System.

## 2025-07-30 ENCOUNTER — OFFICE VISIT (OUTPATIENT)
Dept: ORTHOPEDIC SURGERY | Facility: CLINIC | Age: 50
End: 2025-07-30
Payer: COMMERCIAL

## 2025-07-30 VITALS
DIASTOLIC BLOOD PRESSURE: 81 MMHG | HEIGHT: 61 IN | WEIGHT: 191 LBS | SYSTOLIC BLOOD PRESSURE: 132 MMHG | HEART RATE: 72 BPM | OXYGEN SATURATION: 96 % | BODY MASS INDEX: 36.06 KG/M2

## 2025-07-30 DIAGNOSIS — Z96.652 HX OF TOTAL KNEE ARTHROPLASTY, LEFT: Primary | ICD-10-CM

## 2025-07-30 ASSESSMENT — KOOS JR
KOOS JR SCORE: 1
KOOS JR SCORE: 91.98

## 2025-07-30 NOTE — PROGRESS NOTES
"Chief Complaint  Follow-up and Pain of the Left Knee    Subjective      History of Present Illness  Patient is here today following up on her left knee.  She is 1 year status post left total knee arthroplasty performed Dr. Fontaine on 7/26/2024.    Overall patient reports that she is doing very well.  She denies any concerns with her operative leg.  She states that the right knee is becoming some painful and she is considering a right knee steroid injection.  She would like to postpone surgery for the right knee for couple more years if possible.    She requests a refill of her compound cream prescription as she did not receive it previously. She continues to apply the cream to alleviate numbness and tingling on the knee.       Allergies   Allergen Reactions    Lisinopril Anaphylaxis    Penicillin G Other (See Comments)     Patient states when she was a kid    Alpha-Gal Nausea Only    Penicillins Rash       Objective     Vital Signs:   Vitals:    07/30/25 1501   BP: 132/81   Pulse: 72   SpO2: 96%   Weight: 86.6 kg (191 lb)   Height: 154.9 cm (61\")     Body mass index is 36.09 kg/m².    I reviewed the patient's chief complaint, history of present illness, review of systems, past medical history, surgical history, family history, social history, medications, and allergy list.     Knee   General: Alert, no acute distress.   Left Knee:  Knee stable to varus/valgus stress.  Knee extensor mechanism intact. 0 degrees knee extension. Flexion to 125 degrees. Calf soft, non-tender.  Sensation and neurovascularly intact.  Demonstrates active ankle dorsiflexion and plantarflexion.  Palpable pedal pulses.               Imaging Results (Most Recent)       Procedure Component Value Units Date/Time    XR Knee 3 View Left - Preliminary [[id number]] Resulted: 07/30/25 1526     Updated: 07/30/25 1526    This result has not been signed. Information might be incomplete.      Narrative:      X-Ray Report:  Study: X-rays ordered, taken in the " office, and reviewed today.   Site: Left knee Xray  Indication: Left total knee arthroplasty follow up.   View: AP/Lateral, Standing, and [place] view(s)  Findings: Intact left total knee arthroplasty. No evidence of hardware   malfunction, complication or loosening. No periprosthetic fractures   visualized. Patella is midline tracking on sunrise view.   Prior studies available for comparison: yes                    Assessment and Plan   Diagnoses and all orders for this visit:    1. Hx of total knee arthroplasty, left (Primary)  -     XR Knee 3 View Left         Lexus Tejada is 1 year(s) post-op left total knee arthroplasty performed by Dr Fontaine on 7/26/24. X-rays reviewed, showing hardware intact and no complications.  Patient is doing well. Continue home exercise program to progress strength and ROM.     Discussed the importance of lifelong antibiotic prophylaxis with dental procedures following total joint replacement. In the event of a dental procedure, patient is welcome to contact our office to obtain antibiotics prior to the procedure if their dentist does not provide the prescription.     We also discussed that if a fall/injury were to occur to the affected extremity was advised for patient to obtain x-rays for clarification that no hardware has been compromised or if showing signs of loosening.    Patient verbalized understanding.     Follow-up in 1 year. We will repeat xray's of the prosthetic joint at that time.   Call sooner or return to care, if needed with any changes or concerns.        Tobacco Use: Low Risk  (7/30/2025)    Patient History     Smoking Tobacco Use: Never     Smokeless Tobacco Use: Never     Passive Exposure: Not on file     Patient reports that they are a nonsmoker; cessation education not applicable.            Follow Up   No follow-ups on file.  There are no Patient Instructions on file for this visit.    Patient was given instructions and counseling regarding her condition  or for health maintenance advice. Please see specific information pulled into the AVS if appropriate.       Dictated Utilizing Dragon Dictation. Please note that portions of this note were completed with a voice recognition program. Part of this note may be an electronic transcription/translation of spoken language to printed text using the Dragon Dictation System.

## (undated) DEVICE — TOWEL,OR,DSP,ST,BLUE,STD,4/PK,20PK/CS: Brand: MEDLINE

## (undated) DEVICE — PROXIMATE RH ROTATING HEAD SKIN STAPLERS (35 WIDE) CONTAINS 35 STAINLESS STEEL STAPLES: Brand: PROXIMATE

## (undated) DEVICE — INTENDED FOR TISSUE SEPARATION, AND OTHER PROCEDURES THAT REQUIRE A SHARP SURGICAL BLADE TO PUNCTURE OR CUT.: Brand: BARD-PARKER ® CARBON RIB-BACK BLADES

## (undated) DEVICE — NEEDLE,18GX1.5",REG,BEVEL: Brand: MEDLINE

## (undated) DEVICE — SYR LUERLOK 30CC

## (undated) DEVICE — GAUZE,SPONGE,4"X4",16PLY,STRL,LF,10/TRAY: Brand: MEDLINE

## (undated) DEVICE — SHEET,DRAPE,70X85,STERILE: Brand: MEDLINE

## (undated) DEVICE — DRESSING,GAUZE,XEROFORM,CURAD,1"X8",ST: Brand: CURAD

## (undated) DEVICE — PENCL ES MEGADINE EZ/CLEAN BUTN W/HOLSTR 10FT

## (undated) DEVICE — GLOVE,SURG,SENSICARE SLT,LF,PF,7: Brand: MEDLINE

## (undated) DEVICE — STRYKER PERFORMANCE SERIES SAGITTAL BLADE: Brand: STRYKER PERFORMANCE SERIES

## (undated) DEVICE — NO-SCRATCH ™ SMALL WHITNEY CURETTE ™ IS A SINGLE-USE, PLASTIC CURETTE FOR QUICKLY APPLYING, MANIPULATING AND REMOVING BONE CEMENT DURING HIP AND KNEE REPLACEMENT SURGERY. THE PLASTIC IS SOFTER THAN STEEL INSTRUMENTS, REDUCING THE RISK OF DAMAGING THE PROSTHESIS WITH METAL INSTRUMENTS.  THE CURETTE’S 6MM TIP REMOVES EXCESS CEMENT FROM REPLACEMENT HIPS AND KNEES. EASY-TO-MANEUVER, THE SMALL BLUE CURETTE LETS YOU REMOVE CEMENT FROM ALL EDGES OF THE PROSTHESIS.NO-SCRATCH WHITNEY SMALL CURETTE FEATURES:SAFER THAN STEEL- MADE OF PLASTIC - STURDY YET SOFTER THAN SURGICAL STEEL.HANDIER- EACH TOOL HAS A MOLDED-IN THUMB INDENTATION INSTANTLY ORIENTING THE TOOL.- EASIER TO MANEUVER IN HARD TO SEE PLACES.- COLOR-CODED FOR EASY IDENTIFICATION.FASTER- COMES INDIVIDUALLY PACKAGED IN STERILE, PEEL OPEN POUCH, READY TO GO.- APPLIES, MANIPULATES, OR REMOVES CEMENT WITH FINGERTIP PRECISION.ECONOMICAL- THE COST OF A SINGLE REVISION DWARFS THE COST OF A SINGLE-USE CURETTE. - DISPOSABLE – THERE’S NO NEED TO WASTE TIME REMOVING HARDENED CEMENT OR RE-STERILIZING TOOLS.- LESS EXPENSIVE TO BUY AND INVENTORY - ORDER ONLY THE TOOL YOU USE.- PACKAGED 25 INDIVIDUALLY WRAPPED TOOLS TO A CARTON FOR CONVENIENT SHELF STORAGE.: Brand: WHITNEY NO-SCRATCH CURETTE (SMALL)

## (undated) DEVICE — SOL IRR NACL 0.9PCT 3000ML

## (undated) DEVICE — Device: Brand: PULSAVAC®

## (undated) DEVICE — PULLOVER TOGA, 2X LARGE: Brand: FLYTE, SURGICOOL

## (undated) DEVICE — MAT FLR ABS W/BLU/LINER 56X72IN WHT

## (undated) DEVICE — DRP SURG U/DRP INVISISHIELD PA 48X52IN

## (undated) DEVICE — SCRW HEX PERSONA FML 2.5X25MM PK/2
Type: IMPLANTABLE DEVICE | Site: KNEE | Status: NON-FUNCTIONAL
Removed: 2024-07-26

## (undated) DEVICE — DRSNG PAD ABD 8X10IN STRL

## (undated) DEVICE — CVR LEG BOOTLEG F/R NOSKID 33IN

## (undated) DEVICE — 3 BONE CEMENT MIXER: Brand: MIXEVAC

## (undated) DEVICE — BASIC SINGLE BASIN-LF: Brand: MEDLINE INDUSTRIES, INC.

## (undated) DEVICE — DISPOSABLE TOURNIQUET CUFF 30"X4", 1-LINE, WHITE, STERILE, 1EA/PK, 10PK/CS: Brand: ASP MEDICAL

## (undated) DEVICE — SUT VIC 0 CT1 36IN J946H

## (undated) DEVICE — APPL CHLORAPREP HI/LITE 26ML ORNG

## (undated) DEVICE — STERILE POLYISOPRENE POWDER-FREE SURGICAL GLOVES: Brand: PROTEXIS

## (undated) DEVICE — PEEL-AWAY TOGA, 2X LARGE: Brand: FLYTE

## (undated) DEVICE — ENCORE® LATEX ORTHO SIZE 8, STERILE LATEX POWDER-FREE SURGICAL GLOVE: Brand: ENCORE

## (undated) DEVICE — SUT ETHIB 1 X538H ETX538H

## (undated) DEVICE — GLV SURG SENSICARE PI PF LF 7 GRN STRL

## (undated) DEVICE — DRP ROBOTIC ROSA BX/20

## (undated) DEVICE — TOTAL KNEE-LF: Brand: MEDLINE INDUSTRIES, INC.

## (undated) DEVICE — DRSNG SURESITE WNDW 4X4.5

## (undated) DEVICE — UNDYED BRAIDED (POLYGLACTIN 910), SYNTHETIC ABSORBABLE SUTURE: Brand: COATED VICRYL

## (undated) DEVICE — SLV SCD KN/LEN ADJ EXPRSS BLENDED MD 1P/U